# Patient Record
Sex: MALE | Race: WHITE | NOT HISPANIC OR LATINO | Employment: OTHER | URBAN - METROPOLITAN AREA
[De-identification: names, ages, dates, MRNs, and addresses within clinical notes are randomized per-mention and may not be internally consistent; named-entity substitution may affect disease eponyms.]

---

## 2024-07-21 ENCOUNTER — HOSPITAL ENCOUNTER (INPATIENT)
Facility: HOSPITAL | Age: 84
LOS: 4 days | Discharge: NURSING, REHAB OR LONG-TERM CARE | DRG: 280 | End: 2024-07-25
Attending: EMERGENCY MEDICINE | Admitting: INTERNAL MEDICINE
Payer: COMMERCIAL

## 2024-07-21 ENCOUNTER — APPOINTMENT (EMERGENCY)
Dept: RADIOLOGY | Facility: HOSPITAL | Age: 84
DRG: 280 | End: 2024-07-21
Payer: COMMERCIAL

## 2024-07-21 DIAGNOSIS — I10 PRIMARY HYPERTENSION: ICD-10-CM

## 2024-07-21 DIAGNOSIS — F03.911 DEMENTIA WITH AGITATION, UNSPECIFIED DEMENTIA SEVERITY, UNSPECIFIED DEMENTIA TYPE (HCC): ICD-10-CM

## 2024-07-21 DIAGNOSIS — I21.4 NSTEMI (NON-ST ELEVATED MYOCARDIAL INFARCTION) (HCC): ICD-10-CM

## 2024-07-21 DIAGNOSIS — R41.0 DELIRIUM: ICD-10-CM

## 2024-07-21 DIAGNOSIS — R07.9 CHEST PAIN: Primary | ICD-10-CM

## 2024-07-21 PROBLEM — R79.89 ELEVATED TROPONIN: Status: ACTIVE | Noted: 2024-07-21

## 2024-07-21 LAB
2HR DELTA HS TROPONIN: 286 NG/L
ALBUMIN SERPL BCG-MCNC: 3.9 G/DL (ref 3.5–5)
ALP SERPL-CCNC: 74 U/L (ref 34–104)
ALT SERPL W P-5'-P-CCNC: 9 U/L (ref 7–52)
ANION GAP SERPL CALCULATED.3IONS-SCNC: 15 MMOL/L (ref 4–13)
APTT PPP: 24 SECONDS (ref 23–37)
AST SERPL W P-5'-P-CCNC: 12 U/L (ref 13–39)
BASOPHILS # BLD AUTO: 0.09 THOUSANDS/ÂΜL (ref 0–0.1)
BASOPHILS NFR BLD AUTO: 1 % (ref 0–1)
BILIRUB SERPL-MCNC: 0.35 MG/DL (ref 0.2–1)
BUN SERPL-MCNC: 20 MG/DL (ref 5–25)
CALCIUM SERPL-MCNC: 9 MG/DL (ref 8.4–10.2)
CARDIAC TROPONIN I PNL SERPL HS: 117 NG/L
CARDIAC TROPONIN I PNL SERPL HS: 403 NG/L
CHLORIDE SERPL-SCNC: 101 MMOL/L (ref 96–108)
CO2 SERPL-SCNC: 19 MMOL/L (ref 21–32)
CREAT SERPL-MCNC: 0.81 MG/DL (ref 0.6–1.3)
D DIMER PPP FEU-MCNC: 0.69 UG/ML FEU
EOSINOPHIL # BLD AUTO: 0.18 THOUSAND/ÂΜL (ref 0–0.61)
EOSINOPHIL NFR BLD AUTO: 2 % (ref 0–6)
ERYTHROCYTE [DISTWIDTH] IN BLOOD BY AUTOMATED COUNT: 13.7 % (ref 11.6–15.1)
GFR SERPL CREATININE-BSD FRML MDRD: 80 ML/MIN/1.73SQ M
GLUCOSE SERPL-MCNC: 191 MG/DL (ref 65–140)
GLUCOSE SERPL-MCNC: 196 MG/DL (ref 65–140)
HCT VFR BLD AUTO: 37 % (ref 36.5–49.3)
HGB BLD-MCNC: 12 G/DL (ref 12–17)
IMM GRANULOCYTES # BLD AUTO: 0.05 THOUSAND/UL (ref 0–0.2)
IMM GRANULOCYTES NFR BLD AUTO: 1 % (ref 0–2)
INR PPP: 1.06 (ref 0.84–1.19)
LIPASE SERPL-CCNC: 17 U/L (ref 11–82)
LYMPHOCYTES # BLD AUTO: 1.42 THOUSANDS/ÂΜL (ref 0.6–4.47)
LYMPHOCYTES NFR BLD AUTO: 13 % (ref 14–44)
MAGNESIUM SERPL-MCNC: 1.6 MG/DL (ref 1.9–2.7)
MCH RBC QN AUTO: 30.5 PG (ref 26.8–34.3)
MCHC RBC AUTO-ENTMCNC: 32.4 G/DL (ref 31.4–37.4)
MCV RBC AUTO: 94 FL (ref 82–98)
MONOCYTES # BLD AUTO: 0.95 THOUSAND/ÂΜL (ref 0.17–1.22)
MONOCYTES NFR BLD AUTO: 9 % (ref 4–12)
NEUTROPHILS # BLD AUTO: 7.87 THOUSANDS/ÂΜL (ref 1.85–7.62)
NEUTS SEG NFR BLD AUTO: 74 % (ref 43–75)
NRBC BLD AUTO-RTO: 0 /100 WBCS
PLATELET # BLD AUTO: 349 THOUSANDS/UL (ref 149–390)
PMV BLD AUTO: 9.5 FL (ref 8.9–12.7)
POTASSIUM SERPL-SCNC: 3.8 MMOL/L (ref 3.5–5.3)
PROT SERPL-MCNC: 6.8 G/DL (ref 6.4–8.4)
PROTHROMBIN TIME: 14 SECONDS (ref 11.6–14.5)
RBC # BLD AUTO: 3.93 MILLION/UL (ref 3.88–5.62)
SODIUM SERPL-SCNC: 135 MMOL/L (ref 135–147)
WBC # BLD AUTO: 10.56 THOUSAND/UL (ref 4.31–10.16)

## 2024-07-21 PROCEDURE — 71045 X-RAY EXAM CHEST 1 VIEW: CPT

## 2024-07-21 PROCEDURE — 83880 ASSAY OF NATRIURETIC PEPTIDE: CPT | Performed by: NURSE PRACTITIONER

## 2024-07-21 PROCEDURE — 83735 ASSAY OF MAGNESIUM: CPT | Performed by: EMERGENCY MEDICINE

## 2024-07-21 PROCEDURE — 85730 THROMBOPLASTIN TIME PARTIAL: CPT | Performed by: EMERGENCY MEDICINE

## 2024-07-21 PROCEDURE — 99285 EMERGENCY DEPT VISIT HI MDM: CPT

## 2024-07-21 PROCEDURE — 36415 COLL VENOUS BLD VENIPUNCTURE: CPT | Performed by: EMERGENCY MEDICINE

## 2024-07-21 PROCEDURE — 99285 EMERGENCY DEPT VISIT HI MDM: CPT | Performed by: EMERGENCY MEDICINE

## 2024-07-21 PROCEDURE — 84145 PROCALCITONIN (PCT): CPT | Performed by: NURSE PRACTITIONER

## 2024-07-21 PROCEDURE — 85025 COMPLETE CBC W/AUTO DIFF WBC: CPT | Performed by: EMERGENCY MEDICINE

## 2024-07-21 PROCEDURE — 1124F ACP DISCUSS-NO DSCNMKR DOCD: CPT | Performed by: EMERGENCY MEDICINE

## 2024-07-21 PROCEDURE — 84484 ASSAY OF TROPONIN QUANT: CPT | Performed by: NURSE PRACTITIONER

## 2024-07-21 PROCEDURE — 80053 COMPREHEN METABOLIC PANEL: CPT | Performed by: EMERGENCY MEDICINE

## 2024-07-21 PROCEDURE — 99223 1ST HOSP IP/OBS HIGH 75: CPT | Performed by: NURSE PRACTITIONER

## 2024-07-21 PROCEDURE — 71275 CT ANGIOGRAPHY CHEST: CPT

## 2024-07-21 PROCEDURE — 85610 PROTHROMBIN TIME: CPT | Performed by: EMERGENCY MEDICINE

## 2024-07-21 PROCEDURE — 85379 FIBRIN DEGRADATION QUANT: CPT | Performed by: EMERGENCY MEDICINE

## 2024-07-21 PROCEDURE — 93005 ELECTROCARDIOGRAM TRACING: CPT

## 2024-07-21 PROCEDURE — 82948 REAGENT STRIP/BLOOD GLUCOSE: CPT

## 2024-07-21 PROCEDURE — 96375 TX/PRO/DX INJ NEW DRUG ADDON: CPT

## 2024-07-21 PROCEDURE — 96365 THER/PROPH/DIAG IV INF INIT: CPT

## 2024-07-21 PROCEDURE — 74174 CTA ABD&PLVS W/CONTRAST: CPT

## 2024-07-21 PROCEDURE — 83690 ASSAY OF LIPASE: CPT | Performed by: EMERGENCY MEDICINE

## 2024-07-21 PROCEDURE — 96361 HYDRATE IV INFUSION ADD-ON: CPT

## 2024-07-21 PROCEDURE — 0241U HB NFCT DS VIR RESP RNA 4 TRGT: CPT | Performed by: NURSE PRACTITIONER

## 2024-07-21 PROCEDURE — 84484 ASSAY OF TROPONIN QUANT: CPT | Performed by: EMERGENCY MEDICINE

## 2024-07-21 RX ORDER — FUROSEMIDE 10 MG/ML
40 INJECTION INTRAMUSCULAR; INTRAVENOUS ONCE
Status: DISCONTINUED | OUTPATIENT
Start: 2024-07-21 | End: 2024-07-21

## 2024-07-21 RX ORDER — SODIUM CHLORIDE 1 G/1
1 TABLET ORAL
Status: DISCONTINUED | OUTPATIENT
Start: 2024-07-22 | End: 2024-07-25 | Stop reason: HOSPADM

## 2024-07-21 RX ORDER — SODIUM CHLORIDE 1 G/1
1 TABLET ORAL
COMMUNITY

## 2024-07-21 RX ORDER — NITROGLYCERIN 0.4 MG/1
0.4 TABLET SUBLINGUAL ONCE
Status: DISCONTINUED | OUTPATIENT
Start: 2024-07-21 | End: 2024-07-21

## 2024-07-21 RX ORDER — AMLODIPINE BESYLATE 2.5 MG/1
2.5 TABLET ORAL DAILY
COMMUNITY
End: 2024-07-25

## 2024-07-21 RX ORDER — HEPARIN SODIUM 10000 [USP'U]/100ML
3-30 INJECTION, SOLUTION INTRAVENOUS
Status: DISPENSED | OUTPATIENT
Start: 2024-07-21 | End: 2024-07-23

## 2024-07-21 RX ORDER — GUAIFENESIN 600 MG/1
600 TABLET, EXTENDED RELEASE ORAL EVERY 12 HOURS SCHEDULED
Status: DISCONTINUED | OUTPATIENT
Start: 2024-07-21 | End: 2024-07-22

## 2024-07-21 RX ORDER — HEPARIN SODIUM 1000 [USP'U]/ML
5200 INJECTION, SOLUTION INTRAVENOUS; SUBCUTANEOUS EVERY 6 HOURS PRN
Status: DISCONTINUED | OUTPATIENT
Start: 2024-07-21 | End: 2024-07-24

## 2024-07-21 RX ORDER — NITROGLYCERIN 0.4 MG/1
TABLET SUBLINGUAL
Status: COMPLETED
Start: 2024-07-21 | End: 2024-07-21

## 2024-07-21 RX ORDER — CITALOPRAM 20 MG/1
20 TABLET ORAL DAILY
Status: DISCONTINUED | OUTPATIENT
Start: 2024-07-22 | End: 2024-07-24

## 2024-07-21 RX ORDER — ONDANSETRON 2 MG/ML
4 INJECTION INTRAMUSCULAR; INTRAVENOUS EVERY 6 HOURS PRN
Status: DISCONTINUED | OUTPATIENT
Start: 2024-07-21 | End: 2024-07-25 | Stop reason: HOSPADM

## 2024-07-21 RX ORDER — METOPROLOL TARTRATE 1 MG/ML
5 INJECTION, SOLUTION INTRAVENOUS ONCE
Status: COMPLETED | OUTPATIENT
Start: 2024-07-21 | End: 2024-07-21

## 2024-07-21 RX ORDER — CITALOPRAM 20 MG/1
20 TABLET ORAL DAILY
COMMUNITY
End: 2024-07-25

## 2024-07-21 RX ORDER — ATORVASTATIN CALCIUM 40 MG/1
40 TABLET, FILM COATED ORAL
Status: DISCONTINUED | OUTPATIENT
Start: 2024-07-21 | End: 2024-07-22

## 2024-07-21 RX ORDER — MEMANTINE HYDROCHLORIDE 21 MG/1
28 CAPSULE, EXTENDED RELEASE ORAL DAILY
COMMUNITY

## 2024-07-21 RX ORDER — ASPIRIN 81 MG/1
81 TABLET, CHEWABLE ORAL DAILY
Status: DISCONTINUED | OUTPATIENT
Start: 2024-07-22 | End: 2024-07-25 | Stop reason: HOSPADM

## 2024-07-21 RX ORDER — CEFTRIAXONE 1 G/50ML
1000 INJECTION, SOLUTION INTRAVENOUS EVERY 24 HOURS
Status: DISCONTINUED | OUTPATIENT
Start: 2024-07-21 | End: 2024-07-22

## 2024-07-21 RX ORDER — DIVALPROEX SODIUM 250 MG/1
250 TABLET, DELAYED RELEASE ORAL DAILY
COMMUNITY
End: 2024-07-25

## 2024-07-21 RX ORDER — AMLODIPINE BESYLATE 2.5 MG/1
2.5 TABLET ORAL DAILY
Status: DISCONTINUED | OUTPATIENT
Start: 2024-07-22 | End: 2024-07-21

## 2024-07-21 RX ORDER — INSULIN LISPRO 100 [IU]/ML
1-5 INJECTION, SOLUTION INTRAVENOUS; SUBCUTANEOUS
Status: DISCONTINUED | OUTPATIENT
Start: 2024-07-22 | End: 2024-07-25 | Stop reason: HOSPADM

## 2024-07-21 RX ORDER — DIVALPROEX SODIUM 250 MG/1
250 TABLET, DELAYED RELEASE ORAL DAILY
Status: DISCONTINUED | OUTPATIENT
Start: 2024-07-22 | End: 2024-07-24

## 2024-07-21 RX ORDER — HEPARIN SODIUM 1000 [USP'U]/ML
2600 INJECTION, SOLUTION INTRAVENOUS; SUBCUTANEOUS EVERY 6 HOURS PRN
Status: DISCONTINUED | OUTPATIENT
Start: 2024-07-21 | End: 2024-07-24

## 2024-07-21 RX ORDER — AMLODIPINE BESYLATE 2.5 MG/1
2.5 TABLET ORAL ONCE
Status: COMPLETED | OUTPATIENT
Start: 2024-07-21 | End: 2024-07-21

## 2024-07-21 RX ORDER — ACETAMINOPHEN 325 MG/1
650 TABLET ORAL EVERY 4 HOURS PRN
COMMUNITY

## 2024-07-21 RX ORDER — DAPAGLIFLOZIN 5 MG/1
5 TABLET, FILM COATED ORAL DAILY
COMMUNITY

## 2024-07-21 RX ORDER — MEMANTINE HYDROCHLORIDE 10 MG/1
10 TABLET ORAL 2 TIMES DAILY
Status: DISCONTINUED | OUTPATIENT
Start: 2024-07-22 | End: 2024-07-25 | Stop reason: HOSPADM

## 2024-07-21 RX ORDER — NITROGLYCERIN 0.4 MG/1
0.4 TABLET SUBLINGUAL ONCE
Status: COMPLETED | OUTPATIENT
Start: 2024-07-21 | End: 2024-07-21

## 2024-07-21 RX ORDER — MAGNESIUM SULFATE HEPTAHYDRATE 40 MG/ML
2 INJECTION, SOLUTION INTRAVENOUS ONCE
Status: COMPLETED | OUTPATIENT
Start: 2024-07-21 | End: 2024-07-21

## 2024-07-21 RX ORDER — INSULIN LISPRO 100 [IU]/ML
1-5 INJECTION, SOLUTION INTRAVENOUS; SUBCUTANEOUS
Status: DISCONTINUED | OUTPATIENT
Start: 2024-07-21 | End: 2024-07-25 | Stop reason: HOSPADM

## 2024-07-21 RX ORDER — AMLODIPINE BESYLATE 5 MG/1
5 TABLET ORAL DAILY
Status: DISCONTINUED | OUTPATIENT
Start: 2024-07-22 | End: 2024-07-22

## 2024-07-21 RX ORDER — VIT C/B6/B5/MAGNESIUM/HERB 173 50-5-6-5MG
500 CAPSULE ORAL
COMMUNITY

## 2024-07-21 RX ORDER — HEPARIN SODIUM 1000 [USP'U]/ML
5200 INJECTION, SOLUTION INTRAVENOUS; SUBCUTANEOUS ONCE
Status: COMPLETED | OUTPATIENT
Start: 2024-07-21 | End: 2024-07-21

## 2024-07-21 RX ORDER — FAMOTIDINE 20 MG/1
20 TABLET, FILM COATED ORAL
Status: DISCONTINUED | OUTPATIENT
Start: 2024-07-21 | End: 2024-07-25 | Stop reason: HOSPADM

## 2024-07-21 RX ORDER — FUROSEMIDE 10 MG/ML
40 INJECTION INTRAMUSCULAR; INTRAVENOUS ONCE
Status: COMPLETED | OUTPATIENT
Start: 2024-07-21 | End: 2024-07-21

## 2024-07-21 RX ORDER — ACETAMINOPHEN 325 MG/1
650 TABLET ORAL EVERY 6 HOURS PRN
Status: DISCONTINUED | OUTPATIENT
Start: 2024-07-21 | End: 2024-07-25 | Stop reason: HOSPADM

## 2024-07-21 RX ADMIN — AMLODIPINE BESYLATE 2.5 MG: 2.5 TABLET ORAL at 23:27

## 2024-07-21 RX ADMIN — Medication 12.5 MG: at 23:35

## 2024-07-21 RX ADMIN — FUROSEMIDE 40 MG: 10 INJECTION, SOLUTION INTRAMUSCULAR; INTRAVENOUS at 21:14

## 2024-07-21 RX ADMIN — HEPARIN SODIUM 5200 UNITS: 1000 INJECTION INTRAVENOUS; SUBCUTANEOUS at 21:49

## 2024-07-21 RX ADMIN — NITROGLYCERIN 0.1 MG: 0.4 TABLET SUBLINGUAL at 21:09

## 2024-07-21 RX ADMIN — CEFTRIAXONE 1000 MG: 1 INJECTION, SOLUTION INTRAVENOUS at 23:25

## 2024-07-21 RX ADMIN — SODIUM CHLORIDE 1000 ML: 0.9 INJECTION, SOLUTION INTRAVENOUS at 19:08

## 2024-07-21 RX ADMIN — GUAIFENESIN 600 MG: 600 TABLET, EXTENDED RELEASE ORAL at 23:28

## 2024-07-21 RX ADMIN — METOPROLOL TARTRATE 5 MG: 5 INJECTION INTRAVENOUS at 21:38

## 2024-07-21 RX ADMIN — HEPARIN SODIUM 18 UNITS/KG/HR: 10000 INJECTION, SOLUTION INTRAVENOUS at 21:49

## 2024-07-21 RX ADMIN — ATORVASTATIN CALCIUM 40 MG: 40 TABLET, FILM COATED ORAL at 23:27

## 2024-07-21 RX ADMIN — MAGNESIUM SULFATE HEPTAHYDRATE 2 G: 40 INJECTION, SOLUTION INTRAVENOUS at 20:07

## 2024-07-21 RX ADMIN — IOHEXOL 100 ML: 350 INJECTION, SOLUTION INTRAVENOUS at 20:53

## 2024-07-21 RX ADMIN — FAMOTIDINE 20 MG: 20 TABLET, FILM COATED ORAL at 23:27

## 2024-07-21 NOTE — ED PROVIDER NOTES
"History  Chief Complaint   Patient presents with    Chest Pain     Patient from [facility], hx severe dementia per squad. Patient started with 6/10 substernal CP radiating in between shoulder blades starting \"around dinner time\" per facility. Patient had some depressions on 12 lead per squad which apparently resolved, along with CP, after 1 dose of nitro and 324mg of aspirin. Patient denies CP at this time.      84-year-old male presents with substernal chest pain rating to the back according to the staff he is a very poor historian because of his dementia he denies any complaints at this time on the route here he got nitroglycerin and aspirin.  Denies any shortness of breath pleurisy vomiting nausea diaphoresis or any other symptoms history of hypertension and diabetes noted.      History provided by:  Patient   used: No        Prior to Admission Medications   Prescriptions Last Dose Informant Patient Reported? Taking?   Memantine HCl ER 21 MG CP24 7/21/2024  Yes Yes   Sig: Take 28 mg by mouth daily   Turmeric 500 MG CAPS 7/21/2024 Outside Facility (Specify) Yes Yes   Sig: Take 500 mg by mouth daily with dinner   acetaminophen (TYLENOL) 325 mg tablet Unknown Outside Facility (Specify) Yes No   Sig: Take 650 mg by mouth every 4 (four) hours as needed for mild pain or fever   amLODIPine (NORVASC) 2.5 mg tablet 7/21/2024  Yes Yes   Sig: Take 2.5 mg by mouth daily   citalopram (CeleXA) 20 mg tablet 7/21/2024  Yes Yes   Sig: Take 20 mg by mouth daily   dapagliflozin (Farxiga) 5 MG TABS 7/21/2024  Yes Yes   Sig: Take 5 mg by mouth daily   divalproex sodium (DEPAKOTE) 250 mg DR tablet 7/21/2024  Yes Yes   Sig: Take 250 mg by mouth daily   metFORMIN (GLUCOPHAGE) 500 mg tablet 7/21/2024  Yes Yes   Sig: Take 500 mg by mouth 2 (two) times a day with meals   sodium chloride 1 g tablet 7/21/2024  Yes Yes   Sig: Take 1 g by mouth daily with dinner      Facility-Administered Medications: None       Past " Medical History:   Diagnosis Date    Dementia (HCC)     DM (diabetes mellitus) (HCC)     HTN (hypertension)     Hyponatremia        History reviewed. No pertinent surgical history.    History reviewed. No pertinent family history.  I have reviewed and agree with the history as documented.    E-Cigarette/Vaping    E-Cigarette Use Unknown If Ever Used      E-Cigarette/Vaping Substances     Social History     Tobacco Use    Smoking status: Unknown   Vaping Use    Vaping status: Unknown   Substance Use Topics    Alcohol use: Never       Review of Systems   Constitutional: Negative.    HENT: Negative.     Eyes: Negative.    Respiratory: Negative.     Cardiovascular:  Positive for chest pain.   Gastrointestinal: Negative.    Endocrine: Negative.    Genitourinary: Negative.    Musculoskeletal: Negative.    Skin: Negative.    Allergic/Immunologic: Negative.    Neurological: Negative.    Hematological: Negative.    Psychiatric/Behavioral: Negative.     All other systems reviewed and are negative.      Physical Exam  Physical Exam  Vitals and nursing note reviewed.   Constitutional:       Appearance: Normal appearance.   HENT:      Head: Normocephalic and atraumatic.      Nose: Nose normal.      Mouth/Throat:      Mouth: Mucous membranes are moist.   Eyes:      Extraocular Movements: Extraocular movements intact.      Pupils: Pupils are equal, round, and reactive to light.   Cardiovascular:      Rate and Rhythm: Normal rate and regular rhythm.   Pulmonary:      Effort: Pulmonary effort is normal.      Breath sounds: Normal breath sounds.   Abdominal:      General: Abdomen is flat. Bowel sounds are normal.      Palpations: Abdomen is soft.   Musculoskeletal:         General: Normal range of motion.      Cervical back: Normal range of motion and neck supple.   Skin:     General: Skin is warm.      Capillary Refill: Capillary refill takes less than 2 seconds.   Neurological:      General: No focal deficit present.      Mental  Status: He is alert and oriented to person, place, and time. Mental status is at baseline.   Psychiatric:         Mood and Affect: Mood normal.         Thought Content: Thought content normal.         Vital Signs  ED Triage Vitals   Temperature Pulse Respirations Blood Pressure SpO2   07/21/24 2140 07/21/24 1900 07/21/24 1900 07/21/24 1900 07/21/24 1900   (!) 97.4 °F (36.3 °C) 91 18 166/78 96 %      Temp Source Heart Rate Source Patient Position - Orthostatic VS BP Location FiO2 (%)   07/21/24 2140 07/21/24 1900 07/21/24 1900 07/21/24 1900 --   Tympanic Monitor Lying Right arm       Pain Score       07/21/24 2242       No Pain           Vitals:    07/21/24 2200 07/21/24 2215 07/21/24 2242 07/22/24 0036   BP: 127/62 (!) 178/72 163/69 (!) 173/76   Pulse: 65 73 67 67   Patient Position - Orthostatic VS: Lying            Visual Acuity      ED Medications  Medications   heparin (porcine) 25,000 units in 0.45% NaCl 250 mL infusion (premix) (18 Units/kg/hr × 65 kg (Order-Specific) Intravenous New Bag 7/21/24 2149)   heparin (porcine) injection 5,200 Units (has no administration in time range)   heparin (porcine) injection 2,600 Units (has no administration in time range)   citalopram (CeleXA) tablet 20 mg (has no administration in time range)   divalproex sodium (DEPAKOTE) DR tablet 250 mg (has no administration in time range)   memantine (NAMENDA) tablet 10 mg (has no administration in time range)   sodium chloride tablet 1 g (has no administration in time range)   acetaminophen (TYLENOL) tablet 650 mg (has no administration in time range)   ondansetron (ZOFRAN) injection 4 mg (has no administration in time range)   atorvastatin (LIPITOR) tablet 40 mg (40 mg Oral Given 7/21/24 2327)   aspirin chewable tablet 81 mg (has no administration in time range)   metoprolol tartrate (LOPRESSOR) partial tablet 12.5 mg (12.5 mg Oral Given 7/21/24 2335)   insulin lispro (HumALOG/ADMELOG) 100 units/mL subcutaneous injection 1-5 Units  (has no administration in time range)   insulin lispro (HumALOG/ADMELOG) 100 units/mL subcutaneous injection 1-5 Units (1 Units Subcutaneous Given 7/22/24 0000)   cefTRIAXone (ROCEPHIN) IVPB (premix in dextrose) 1,000 mg 50 mL (1,000 mg Intravenous New Bag 7/21/24 2325)   famotidine (PEPCID) tablet 20 mg (20 mg Oral Given 7/21/24 2327)   nitroglycerin (NITROSTAT) SL tablet 0.4 mg (0.4 mg Sublingual Given 7/22/24 0032)   dextromethorphan-guaiFENesin (ROBITUSSIN DM) oral syrup 10 mL (has no administration in time range)   amLODIPine (NORVASC) tablet 5 mg (has no administration in time range)   sodium chloride 0.9 % bolus 1,000 mL (0 mL Intravenous Stopped 7/21/24 2008)   magnesium sulfate 2 g/50 mL IVPB (premix) 2 g (0 g Intravenous Stopped 7/21/24 2107)   iohexol (OMNIPAQUE) 350 MG/ML injection (MULTI-DOSE) 100 mL (100 mL Intravenous Given 7/21/24 2053)   nitroglycerin (NITROSTAT) SL tablet 0.4 mg (0.1 mg Sublingual Given 7/21/24 2109)   furosemide (LASIX) injection 40 mg (40 mg Intravenous Given 7/21/24 2114)   metoprolol (LOPRESSOR) injection 5 mg (5 mg Intravenous Given 7/21/24 2138)   heparin (porcine) injection 5,200 Units (5,200 Units Intravenous Given 7/21/24 2149)   amLODIPine (NORVASC) tablet 2.5 mg (2.5 mg Oral Given 7/21/24 2327)   QUEtiapine (SEROquel) tablet 12.5 mg (12.5 mg Oral Given 7/22/24 0037)   potassium chloride (Klor-Con M20) CR tablet 20 mEq (20 mEq Oral Given 7/22/24 0037)       Diagnostic Studies  Results Reviewed       Procedure Component Value Units Date/Time    B-Type Natriuretic Peptide(BNP) [[id number]]  (Abnormal) Collected: 07/21/24 1906    Lab Status: Final result Specimen: Blood from Arm, Right Updated: 07/22/24 0029      pg/mL     Procalcitonin [[id number]]  (Normal) Collected: 07/21/24 1906    Lab Status: Final result Specimen: Blood from Arm, Right Updated: 07/22/24 0022     Procalcitonin <0.05 ng/ml     HS Troponin I 4hr [[id number]]  (Abnormal) Collected: 07/21/24 3017     Lab Status: Final result Specimen: Blood from Hand, Right Updated: 07/22/24 0002     hs TnI 4hr 1,702 ng/L      Delta 4hr hsTnI 1,585 ng/L     Hemoglobin A1C [[id number]] Collected: 07/21/24 1906    Lab Status: In process Specimen: Blood from Arm, Right Updated: 07/21/24 2344    HS Troponin I 2hr [[id number]]  (Abnormal) Collected: 07/21/24 2109    Lab Status: Final result Specimen: Blood from Arm, Left Updated: 07/21/24 2147     hs TnI 2hr 403 ng/L      Delta 2hr hsTnI 286 ng/L     D-Dimer [[id number]]  (Abnormal) Collected: 07/21/24 1906    Lab Status: Final result Specimen: Blood from Arm, Right Updated: 07/21/24 1940     D-Dimer, Quant 0.69 ug/ml FEU     Narrative:      In the evaluation for possible pulmonary embolism, in the appropriate (Well's Score of 4 or less) patient, the age adjusted d-dimer cutoff for this patient can be calculated as:    Age x 0.01 (in ug/mL) for Age-adjusted D-dimer exclusion threshold for a patient over 50 years.    HS Troponin 0hr (reflex protocol) [[id number]]  (Abnormal) Collected: 07/21/24 1906    Lab Status: Final result Specimen: Blood from Arm, Right Updated: 07/21/24 1935     hs TnI 0hr 117 ng/L     Comprehensive metabolic panel [[id number]]  (Abnormal) Collected: 07/21/24 1906    Lab Status: Final result Specimen: Blood from Arm, Right Updated: 07/21/24 1929     Sodium 135 mmol/L      Potassium 3.8 mmol/L      Chloride 101 mmol/L      CO2 19 mmol/L      ANION GAP 15 mmol/L      BUN 20 mg/dL      Creatinine 0.81 mg/dL      Glucose 196 mg/dL      Calcium 9.0 mg/dL      AST 12 U/L      ALT 9 U/L      Alkaline Phosphatase 74 U/L      Total Protein 6.8 g/dL      Albumin 3.9 g/dL      Total Bilirubin 0.35 mg/dL      eGFR 80 ml/min/1.73sq m     Narrative:      National Kidney Disease Foundation guidelines for Chronic Kidney Disease (CKD):     Stage 1 with normal or high GFR (GFR > 90 mL/min/1.73 square meters)    Stage 2 Mild CKD (GFR = 60-89 mL/min/1.73 square meters)    Stage 3A  Moderate CKD (GFR = 45-59 mL/min/1.73 square meters)    Stage 3B Moderate CKD (GFR = 30-44 mL/min/1.73 square meters)    Stage 4 Severe CKD (GFR = 15-29 mL/min/1.73 square meters)    Stage 5 End Stage CKD (GFR <15 mL/min/1.73 square meters)  Note: GFR calculation is accurate only with a steady state creatinine    Magnesium [[id number]]  (Abnormal) Collected: 07/21/24 1906    Lab Status: Final result Specimen: Blood from Arm, Right Updated: 07/21/24 1929     Magnesium 1.6 mg/dL     Lipase [[id number]]  (Normal) Collected: 07/21/24 1906    Lab Status: Final result Specimen: Blood from Arm, Right Updated: 07/21/24 1929     Lipase 17 u/L     Protime-INR [[id number]]  (Normal) Collected: 07/21/24 1906    Lab Status: Final result Specimen: Blood from Arm, Right Updated: 07/21/24 1927     Protime 14.0 seconds      INR 1.06    APTT [[id number]]  (Normal) Collected: 07/21/24 1906    Lab Status: Final result Specimen: Blood from Arm, Right Updated: 07/21/24 1927     PTT 24 seconds     CBC and differential [[id number]]  (Abnormal) Collected: 07/21/24 1906    Lab Status: Final result Specimen: Blood from Arm, Right Updated: 07/21/24 1911     WBC 10.56 Thousand/uL      RBC 3.93 Million/uL      Hemoglobin 12.0 g/dL      Hematocrit 37.0 %      MCV 94 fL      MCH 30.5 pg      MCHC 32.4 g/dL      RDW 13.7 %      MPV 9.5 fL      Platelets 349 Thousands/uL      nRBC 0 /100 WBCs      Segmented % 74 %      Immature Grans % 1 %      Lymphocytes % 13 %      Monocytes % 9 %      Eosinophils Relative 2 %      Basophils Relative 1 %      Absolute Neutrophils 7.87 Thousands/µL      Absolute Immature Grans 0.05 Thousand/uL      Absolute Lymphocytes 1.42 Thousands/µL      Absolute Monocytes 0.95 Thousand/µL      Eosinophils Absolute 0.18 Thousand/µL      Basophils Absolute 0.09 Thousands/µL                    CTA dissection protocol chest/abdomen/pelvis   Final Result by Beth Wallace MD (07/21 2129)      No evidence of aortic dissection.       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         Workstation performed: [id number]         XR chest 1 view portable    (Results Pending)              Procedures  ECG 12 Lead Documentation Only    Date/Time: 7/21/2024 7:06 PM    Performed by: Rosalind Gusman DO  Authorized by: Rosalind Gusman DO    ECG reviewed by me, the ED Provider: yes    Patient location:  ED  Previous ECG:     Previous ECG:  Unavailable    Comparison to cardiac monitor: Yes    Interpretation:     Interpretation: non-specific    Rate:     ECG rate assessment: normal    Rhythm:     Rhythm: sinus rhythm    Ectopy:     Ectopy: PVCs    QRS:     QRS axis:  Normal  Conduction:     Conduction: normal    ST segments:     ST segments:  Abnormal    Depression:  V1, V2, V3, V6 and V5  T waves:     T waves: non-specific             ED Course                                               Medical Decision Making  Patient evaluated in the ED with EKG labs and imaging.  I spoke to the cardiologist on-call reviewed EKGs with him Dr. Turner troponin elevated for calling this non-ST elevation MI.  Started on heparin drip initially given nitro and Lasix as he got short of breath possibly secondary to flash pulmonary edema patient much improved remained stable ICU evaluated at bedside patient stable to go to stepdown to admitted to the hospitalist for further evaluation and management    Problems Addressed:  Chest pain: acute illness or injury  NSTEMI (non-ST elevated myocardial infarction) (HCC): acute illness or injury    Amount and/or Complexity of Data Reviewed  External Data Reviewed: notes.  Labs: ordered. Decision-making details documented in ED Course.  Radiology: ordered. Decision-making details documented in ED Course.  ECG/medicine tests: ordered and independent interpretation  performed. Decision-making details documented in ED Course.    Risk  Prescription drug management.  Decision regarding hospitalization.                 Disposition  Final diagnoses:   Chest pain   NSTEMI (non-ST elevated myocardial infarction) ([facility])     Time reflects when diagnosis was documented in both MDM as applicable and the Disposition within this note       Time User Action Codes Description Comment    7/21/2024 10:05 PM Rosalind Gusman [R07.9] Chest pain     7/21/2024 10:05 PM Rosalind Gusman [I21.4] NSTEMI (non-ST elevated myocardial infarction) ([facility])           ED Disposition       ED Disposition   Admit    Condition   Stable    Date/Time   Sun Jul 21, 2024 10:05 PM    Comment                  Follow-up Information    None         Current Discharge Medication List        CONTINUE these medications which have NOT CHANGED    Details   amLODIPine (NORVASC) 2.5 mg tablet Take 2.5 mg by mouth daily      citalopram (CeleXA) 20 mg tablet Take 20 mg by mouth daily      dapagliflozin (Farxiga) 5 MG TABS Take 5 mg by mouth daily      divalproex sodium (DEPAKOTE) 250 mg DR tablet Take 250 mg by mouth daily      Memantine HCl ER 21 MG CP24 Take 28 mg by mouth daily      metFORMIN (GLUCOPHAGE) 500 mg tablet Take 500 mg by mouth 2 (two) times a day with meals      sodium chloride 1 g tablet Take 1 g by mouth daily with dinner      Turmeric 500 MG CAPS Take 500 mg by mouth daily with dinner      acetaminophen (TYLENOL) 325 mg tablet Take 650 mg by mouth every 4 (four) hours as needed for mild pain or fever             No discharge procedures on file.    PDMP Review       None            ED Provider  Electronically Signed by             Rosalind Gusman DO  07/22/24 0103

## 2024-07-22 ENCOUNTER — APPOINTMENT (INPATIENT)
Dept: NON INVASIVE DIAGNOSTICS | Facility: HOSPITAL | Age: 84
DRG: 280 | End: 2024-07-22
Payer: COMMERCIAL

## 2024-07-22 ENCOUNTER — APPOINTMENT (INPATIENT)
Dept: RADIOLOGY | Facility: HOSPITAL | Age: 84
DRG: 280 | End: 2024-07-22
Payer: COMMERCIAL

## 2024-07-22 PROBLEM — I21.4 NSTEMI (NON-ST ELEVATED MYOCARDIAL INFARCTION) (HCC): Status: ACTIVE | Noted: 2024-07-21

## 2024-07-22 PROBLEM — R93.89 ABNORMAL CT SCAN: Status: ACTIVE | Noted: 2024-07-22

## 2024-07-22 PROBLEM — I73.9 PAD (PERIPHERAL ARTERY DISEASE) (HCC): Status: ACTIVE | Noted: 2024-07-22

## 2024-07-22 PROBLEM — R05.9 COUGH: Status: ACTIVE | Noted: 2024-07-22

## 2024-07-22 PROBLEM — J96.01 ACUTE HYPOXEMIC RESPIRATORY FAILURE (HCC): Status: ACTIVE | Noted: 2024-07-22

## 2024-07-22 LAB
4HR DELTA HS TROPONIN: 1585 NG/L
ANION GAP SERPL CALCULATED.3IONS-SCNC: 9 MMOL/L (ref 4–13)
AORTIC ROOT: 3.7 CM
APICAL FOUR CHAMBER EJECTION FRACTION: 47 %
APTT PPP: 105 SECONDS (ref 23–37)
APTT PPP: 174 SECONDS (ref 23–37)
APTT PPP: 69 SECONDS (ref 23–37)
ATRIAL RATE: 47 BPM
ATRIAL RATE: 65 BPM
ATRIAL RATE: 86 BPM
ATRIAL RATE: 91 BPM
BASOPHILS # BLD AUTO: 0.07 THOUSANDS/ÂΜL (ref 0–0.1)
BASOPHILS NFR BLD AUTO: 1 % (ref 0–1)
BNP SERPL-MCNC: 176 PG/ML (ref 0–100)
BSA FOR ECHO PROCEDURE: 1.75 M2
BUN SERPL-MCNC: 19 MG/DL (ref 5–25)
CALCIUM SERPL-MCNC: 8.5 MG/DL (ref 8.4–10.2)
CARDIAC TROPONIN I PNL SERPL HS: 1702 NG/L
CARDIAC TROPONIN I PNL SERPL HS: 4485 NG/L (ref 8–18)
CARDIAC TROPONIN I PNL SERPL HS: 4857 NG/L (ref 8–18)
CHLORIDE SERPL-SCNC: 101 MMOL/L (ref 96–108)
CHOLEST SERPL-MCNC: 297 MG/DL
CO2 SERPL-SCNC: 24 MMOL/L (ref 21–32)
CREAT SERPL-MCNC: 0.76 MG/DL (ref 0.6–1.3)
E WAVE DECELERATION TIME: 246 MS
E/A RATIO: 0.62
EOSINOPHIL # BLD AUTO: 0.1 THOUSAND/ÂΜL (ref 0–0.61)
EOSINOPHIL NFR BLD AUTO: 1 % (ref 0–6)
ERYTHROCYTE [DISTWIDTH] IN BLOOD BY AUTOMATED COUNT: 13.5 % (ref 11.6–15.1)
EST. AVERAGE GLUCOSE BLD GHB EST-MCNC: 154 MG/DL
FLUAV RNA RESP QL NAA+PROBE: NEGATIVE
FLUBV RNA RESP QL NAA+PROBE: NEGATIVE
FRACTIONAL SHORTENING: 11 (ref 28–44)
GFR SERPL CREATININE-BSD FRML MDRD: 82 ML/MIN/1.73SQ M
GLUCOSE SERPL-MCNC: 133 MG/DL (ref 65–140)
GLUCOSE SERPL-MCNC: 143 MG/DL (ref 65–140)
GLUCOSE SERPL-MCNC: 149 MG/DL (ref 65–140)
GLUCOSE SERPL-MCNC: 161 MG/DL (ref 65–140)
GLUCOSE SERPL-MCNC: 178 MG/DL (ref 65–140)
HBA1C MFR BLD: 7 %
HCT VFR BLD AUTO: 36.2 % (ref 36.5–49.3)
HDLC SERPL-MCNC: 39 MG/DL
HGB BLD-MCNC: 12.1 G/DL (ref 12–17)
IMM GRANULOCYTES # BLD AUTO: 0.05 THOUSAND/UL (ref 0–0.2)
IMM GRANULOCYTES NFR BLD AUTO: 1 % (ref 0–2)
INTERVENTRICULAR SEPTUM IN DIASTOLE (PARASTERNAL SHORT AXIS VIEW): 1.2 CM
INTERVENTRICULAR SEPTUM: 1.2 CM (ref 0.6–1.1)
LAAS-AP2: 22 CM2
LAAS-AP4: 16.4 CM2
LDLC SERPL CALC-MCNC: 240 MG/DL (ref 0–100)
LEFT ATRIUM SIZE: 3.3 CM
LEFT ATRIUM VOLUME (MOD BIPLANE): 59 ML
LEFT ATRIUM VOLUME INDEX (MOD BIPLANE): 36 ML/M2
LEFT INTERNAL DIMENSION IN SYSTOLE: 3.4 CM (ref 2.1–4)
LEFT VENTRICLE DIASTOLIC VOLUME (MOD BIPLANE): 119 ML
LEFT VENTRICLE DIASTOLIC VOLUME INDEX (MOD BIPLANE): 68 ML/M2
LEFT VENTRICLE SYSTOLIC VOLUME (MOD BIPLANE): 58 ML
LEFT VENTRICLE SYSTOLIC VOLUME INDEX (MOD BIPLANE): 33.1 ML/M2
LEFT VENTRICULAR INTERNAL DIMENSION IN DIASTOLE: 3.8 CM (ref 3.5–6)
LEFT VENTRICULAR POSTERIOR WALL IN END DIASTOLE: 1.2 CM
LEFT VENTRICULAR STROKE VOLUME: 14 ML
LV EF: 51 %
LVSV (TEICH): 14 ML
LYMPHOCYTES # BLD AUTO: 1.96 THOUSANDS/ÂΜL (ref 0.6–4.47)
LYMPHOCYTES NFR BLD AUTO: 18 % (ref 14–44)
MAGNESIUM SERPL-MCNC: 2.2 MG/DL (ref 1.9–2.7)
MCH RBC QN AUTO: 31 PG (ref 26.8–34.3)
MCHC RBC AUTO-ENTMCNC: 33.4 G/DL (ref 31.4–37.4)
MCV RBC AUTO: 93 FL (ref 82–98)
MONOCYTES # BLD AUTO: 1.03 THOUSAND/ÂΜL (ref 0.17–1.22)
MONOCYTES NFR BLD AUTO: 9 % (ref 4–12)
MV E'TISSUE VEL-LAT: 4 CM/S
MV E'TISSUE VEL-SEP: 4 CM/S
MV PEAK A VEL: 1.08 M/S
MV PEAK E VEL: 67 CM/S
MV STENOSIS PRESSURE HALF TIME: 71 MS
MV VALVE AREA P 1/2 METHOD: 3.1
NEUTROPHILS # BLD AUTO: 7.73 THOUSANDS/ÂΜL (ref 1.85–7.62)
NEUTS SEG NFR BLD AUTO: 70 % (ref 43–75)
NRBC BLD AUTO-RTO: 0 /100 WBCS
P AXIS: 29 DEGREES
P AXIS: 50 DEGREES
P AXIS: 57 DEGREES
P AXIS: 66 DEGREES
PLATELET # BLD AUTO: 337 THOUSANDS/UL (ref 149–390)
PMV BLD AUTO: 9.7 FL (ref 8.9–12.7)
POTASSIUM SERPL-SCNC: 4.3 MMOL/L (ref 3.5–5.3)
PR INTERVAL: 168 MS
PR INTERVAL: 174 MS
PR INTERVAL: 198 MS
PR INTERVAL: 212 MS
PROCALCITONIN SERPL-MCNC: <0.05 NG/ML
QRS AXIS: 59 DEGREES
QRS AXIS: 60 DEGREES
QRS AXIS: 70 DEGREES
QRS AXIS: 75 DEGREES
QRSD INTERVAL: 84 MS
QRSD INTERVAL: 84 MS
QRSD INTERVAL: 92 MS
QRSD INTERVAL: 92 MS
QT INTERVAL: 340 MS
QT INTERVAL: 356 MS
QT INTERVAL: 388 MS
QT INTERVAL: 450 MS
QTC INTERVAL: 406 MS
QTC INTERVAL: 437 MS
QTC INTERVAL: 468 MS
QTC INTERVAL: 477 MS
RBC # BLD AUTO: 3.9 MILLION/UL (ref 3.88–5.62)
RIGHT ATRIUM AREA SYSTOLE A4C: 11.1 CM2
RIGHT VENTRICLE ID DIMENSION: 2.7 CM
RSV RNA RESP QL NAA+PROBE: NEGATIVE
SARS-COV-2 RNA RESP QL NAA+PROBE: NEGATIVE
SL CV LEFT ATRIUM LENGTH A2C: 5.2 CM
SL CV LV EF: 45
SL CV PED ECHO LEFT VENTRICLE DIASTOLIC VOLUME (MOD BIPLANE) 2D: 61 ML
SL CV PED ECHO LEFT VENTRICLE SYSTOLIC VOLUME (MOD BIPLANE) 2D: 47 ML
SODIUM SERPL-SCNC: 134 MMOL/L (ref 135–147)
T WAVE AXIS: -74 DEGREES
T WAVE AXIS: 117 DEGREES
T WAVE AXIS: 261 DEGREES
T WAVE AXIS: 71 DEGREES
TR MAX PG: 25 MMHG
TR PEAK VELOCITY: 2.5 M/S
TRICUSPID ANNULAR PLANE SYSTOLIC EXCURSION: 1.8 CM
TRICUSPID VALVE PEAK REGURGITATION VELOCITY: 2.52 M/S
TRIGL SERPL-MCNC: 90 MG/DL
TSH SERPL DL<=0.05 MIU/L-ACNC: 1.89 UIU/ML (ref 0.45–4.5)
VENTRICULAR RATE: 65 BPM
VENTRICULAR RATE: 86 BPM
VENTRICULAR RATE: 91 BPM
VENTRICULAR RATE: 91 BPM
WBC # BLD AUTO: 10.94 THOUSAND/UL (ref 4.31–10.16)

## 2024-07-22 PROCEDURE — 97163 PT EVAL HIGH COMPLEX 45 MIN: CPT

## 2024-07-22 PROCEDURE — 80048 BASIC METABOLIC PNL TOTAL CA: CPT | Performed by: NURSE PRACTITIONER

## 2024-07-22 PROCEDURE — 84484 ASSAY OF TROPONIN QUANT: CPT | Performed by: NURSE PRACTITIONER

## 2024-07-22 PROCEDURE — 87081 CULTURE SCREEN ONLY: CPT | Performed by: NURSE PRACTITIONER

## 2024-07-22 PROCEDURE — 71045 X-RAY EXAM CHEST 1 VIEW: CPT

## 2024-07-22 PROCEDURE — 97110 THERAPEUTIC EXERCISES: CPT

## 2024-07-22 PROCEDURE — 93306 TTE W/DOPPLER COMPLETE: CPT | Performed by: INTERNAL MEDICINE

## 2024-07-22 PROCEDURE — 93306 TTE W/DOPPLER COMPLETE: CPT

## 2024-07-22 PROCEDURE — 82948 REAGENT STRIP/BLOOD GLUCOSE: CPT

## 2024-07-22 PROCEDURE — 99232 SBSQ HOSP IP/OBS MODERATE 35: CPT | Performed by: STUDENT IN AN ORGANIZED HEALTH CARE EDUCATION/TRAINING PROGRAM

## 2024-07-22 PROCEDURE — 85025 COMPLETE CBC W/AUTO DIFF WBC: CPT | Performed by: NURSE PRACTITIONER

## 2024-07-22 PROCEDURE — 83036 HEMOGLOBIN GLYCOSYLATED A1C: CPT | Performed by: NURSE PRACTITIONER

## 2024-07-22 PROCEDURE — 97167 OT EVAL HIGH COMPLEX 60 MIN: CPT

## 2024-07-22 PROCEDURE — 84443 ASSAY THYROID STIM HORMONE: CPT | Performed by: NURSE PRACTITIONER

## 2024-07-22 PROCEDURE — 99223 1ST HOSP IP/OBS HIGH 75: CPT | Performed by: INTERNAL MEDICINE

## 2024-07-22 PROCEDURE — 83735 ASSAY OF MAGNESIUM: CPT | Performed by: NURSE PRACTITIONER

## 2024-07-22 PROCEDURE — 85730 THROMBOPLASTIN TIME PARTIAL: CPT | Performed by: STUDENT IN AN ORGANIZED HEALTH CARE EDUCATION/TRAINING PROGRAM

## 2024-07-22 PROCEDURE — 93010 ELECTROCARDIOGRAM REPORT: CPT | Performed by: INTERNAL MEDICINE

## 2024-07-22 PROCEDURE — 85730 THROMBOPLASTIN TIME PARTIAL: CPT | Performed by: NURSE PRACTITIONER

## 2024-07-22 PROCEDURE — 80061 LIPID PANEL: CPT | Performed by: NURSE PRACTITIONER

## 2024-07-22 RX ORDER — GUAIFENESIN/DEXTROMETHORPHAN 100-10MG/5
10 SYRUP ORAL EVERY 4 HOURS PRN
Status: DISCONTINUED | OUTPATIENT
Start: 2024-07-22 | End: 2024-07-25 | Stop reason: HOSPADM

## 2024-07-22 RX ORDER — QUETIAPINE FUMARATE 25 MG/1
12.5 TABLET, FILM COATED ORAL ONCE
Status: COMPLETED | OUTPATIENT
Start: 2024-07-22 | End: 2024-07-22

## 2024-07-22 RX ORDER — POTASSIUM CHLORIDE 20 MEQ/1
20 TABLET, EXTENDED RELEASE ORAL ONCE
Status: COMPLETED | OUTPATIENT
Start: 2024-07-22 | End: 2024-07-22

## 2024-07-22 RX ORDER — NITROGLYCERIN 0.4 MG/1
0.4 TABLET SUBLINGUAL
Status: DISCONTINUED | OUTPATIENT
Start: 2024-07-22 | End: 2024-07-25 | Stop reason: HOSPADM

## 2024-07-22 RX ORDER — ATORVASTATIN CALCIUM 80 MG/1
80 TABLET, FILM COATED ORAL
Status: DISCONTINUED | OUTPATIENT
Start: 2024-07-22 | End: 2024-07-25 | Stop reason: HOSPADM

## 2024-07-22 RX ORDER — AMLODIPINE BESYLATE 5 MG/1
5 TABLET ORAL DAILY
Status: DISCONTINUED | OUTPATIENT
Start: 2024-07-22 | End: 2024-07-25 | Stop reason: HOSPADM

## 2024-07-22 RX ADMIN — NITROGLYCERIN 0.4 MG: 0.4 TABLET SUBLINGUAL at 00:32

## 2024-07-22 RX ADMIN — CITALOPRAM HYDROBROMIDE 20 MG: 20 TABLET ORAL at 08:34

## 2024-07-22 RX ADMIN — Medication 12.5 MG: at 21:35

## 2024-07-22 RX ADMIN — SODIUM CHLORIDE 1 G: 1 TABLET ORAL at 17:34

## 2024-07-22 RX ADMIN — MEMANTINE 10 MG: 10 TABLET ORAL at 17:34

## 2024-07-22 RX ADMIN — INSULIN LISPRO 1 UNITS: 100 INJECTION, SOLUTION INTRAVENOUS; SUBCUTANEOUS at 00:00

## 2024-07-22 RX ADMIN — Medication 12.5 MG: at 08:34

## 2024-07-22 RX ADMIN — ATORVASTATIN CALCIUM 80 MG: 80 TABLET, FILM COATED ORAL at 17:34

## 2024-07-22 RX ADMIN — FAMOTIDINE 20 MG: 20 TABLET, FILM COATED ORAL at 21:35

## 2024-07-22 RX ADMIN — AMLODIPINE BESYLATE 5 MG: 5 TABLET ORAL at 08:34

## 2024-07-22 RX ADMIN — ASPIRIN 81 MG CHEWABLE TABLET 81 MG: 81 TABLET CHEWABLE at 08:34

## 2024-07-22 RX ADMIN — QUETIAPINE FUMARATE 12.5 MG: 25 TABLET ORAL at 00:37

## 2024-07-22 RX ADMIN — POTASSIUM CHLORIDE 20 MEQ: 1500 TABLET, EXTENDED RELEASE ORAL at 00:37

## 2024-07-22 RX ADMIN — MEMANTINE 10 MG: 10 TABLET ORAL at 08:34

## 2024-07-22 RX ADMIN — HEPARIN SODIUM 13 UNITS/KG/HR: 10000 INJECTION, SOLUTION INTRAVENOUS at 21:37

## 2024-07-22 RX ADMIN — INSULIN LISPRO 1 UNITS: 100 INJECTION, SOLUTION INTRAVENOUS; SUBCUTANEOUS at 17:32

## 2024-07-22 RX ADMIN — DIVALPROEX SODIUM 250 MG: 250 TABLET, DELAYED RELEASE ORAL at 08:34

## 2024-07-22 NOTE — ASSESSMENT & PLAN NOTE
On Norvasc 2.5 mg p.o. daily outpatient.  BP labile in ED.  Will order additional Norvasc 2.5 mg p.o. now and increase Norvasc to 5 mg p.o. daily starting tomorrow  Started on low-dose metoprolol as above  Goal SBP < 150 per cardiology recommendation.  Monitor

## 2024-07-22 NOTE — UTILIZATION REVIEW
NOTIFICATION OF INPATIENT ADMISSION   AUTHORIZATION REQUEST   SERVICING FACILITY:   [place] [address]  Tax ID: [id number]  NPI: [id number] ATTENDING PROVIDER:  Attending Name and NPI#: Enio Carlson Md [[id number]]  Address: [address] [place] [address]  Phone: [phone]   ADMISSION INFORMATION:  Place of Service: Inpatient Acute [facility]  Place of Service Code: 21  Inpatient Admission Date/Time: 7/21/24 10:04 PM  Discharge Date/Time: No discharge date for patient encounter.  Admitting Diagnosis Code/Description:  Chest pain [R07.9]  NSTEMI (non-ST elevated myocardial infarction) (HCC) [I21.4]     UTILIZATION REVIEW CONTACT:  Poornima Jensen Utilization   Network Utilization Review Department  Phone: [phone]  Fax [phone]  Email: [email or web address]  Contact for approvals/pending authorizations, clinical reviews, and discharge.     PHYSICIAN ADVISORY SERVICES:  Medical Necessity Denial & Rwfg-eu-Ut[employer] Review  Phone: [phone]  Fax: [phone]  Email: [email or web address]     DISCHARGE SUPPORT TEAM:  For Patients Discharge Needs & Updates  Phone: [phone] opt. 2 Fax: [phone]  Email: [email or web address]

## 2024-07-22 NOTE — ASSESSMENT & PLAN NOTE
Patient presents with chest pain.  Per staff at [facility], patient was seen shaking in the dining room after dinner, so staff attended to him and he reported he had chest pain with radiation to back.  911 was called.  EKG showed V3 V4 ST depression, patient was given 4 baby aspirin by EMS.  Per ED note, chest pain resolved after 1 dose of nitro and 324mg of aspirin.   Initial EKG showed sinus rhythm with occasional PVC, ST depression V2 through V6  Initial troponin 117.  Repeat troponin [id number]  Second EKG showed sinus rhythm with first-degree AV block, ST depression V1 through V6  Third EKG showed NSR, subtle ST depression V3 to V6, flat T wave in inferior leads, QTc 468.  CTA dissection protocol no evidence of aortic dissection  Currently on IV Heparin drip  Resume Asprin, Statin and Metoprolol  TTE pending  Resume telemetry monitoring  Cardiology consulted

## 2024-07-22 NOTE — PROGRESS NOTES
Patient keeps on getting up every 5 minutes, removing Masimo sensor, telemetry leads, pulling IV's, even pushing the IV poll, very forgetful. Hospitalist Atitla Garrison made aware with new order to give Seroquel 12.5 mg po x 1 dose.     Patient was also observed holding his chest and  moaning. On asking he verbalized he's having chest pain but unable to rate severity.  EKG obtained showed NSR with ST and T wave abnormality. Hospitalist made aware with new order to give PRN Nitroglycerin  0.4 mg.  Given at 0032 with relief after 5 minutes.    Telemonitoring in progress. Continue plan of care.

## 2024-07-22 NOTE — ASSESSMENT & PLAN NOTE
"No results found for: \"HGBA1C\"    Recent Labs     07/21/24  2340 07/22/24  0720   POCGLU 191* 143*         Blood Sugar Average: Last 72 hrs:  (P) 167    Resume SSI regimen   "

## 2024-07-22 NOTE — ASSESSMENT & PLAN NOTE
Continue Namenda, Celexa and Depakote  Was agitated overnight and was given Seroquel along with virtual observation.  Can consider Psych evaluation if persistent

## 2024-07-22 NOTE — ASSESSMENT & PLAN NOTE
CTA showed - Severe vessel disease or occlusion at the V2 segment of the proximal left vertebral artery.Severe stenosis with poststenotic dilatation at the celiac axis. 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  Refer to vascular on discharge  Patient started on aspirin Lipitor as above  Lipid panel in a.m.

## 2024-07-22 NOTE — ASSESSMENT & PLAN NOTE
Pt was started on Ceftriaxone empirically during admission. CT reveals no evidence of pneumonia.  Procal level is WNL.  Pt has no leukocytosis and is afebrile.  Will discontinue Ceftriaxone and follow clinically

## 2024-07-22 NOTE — PLAN OF CARE
Problem: PAIN - ADULT  Goal: Verbalizes/displays adequate comfort level or baseline comfort level  Description: Interventions:  - Encourage patient to monitor pain and request assistance  - Assess pain using appropriate pain scale  - Administer analgesics based on type and severity of pain and evaluate response  - Implement non-pharmacological measures as appropriate and evaluate response  - Consider cultural and social influences on pain and pain management  - Notify physician/advanced practitioner if interventions unsuccessful or patient reports new pain  Outcome: Progressing     Problem: INFECTION - ADULT  Goal: Absence or prevention of progression during hospitalization  Description: INTERVENTIONS:  - Assess and monitor for signs and symptoms of infection  - Monitor lab/diagnostic results  - Monitor all insertion sites, i.e. indwelling lines, tubes, and drains  - Monitor endotracheal if appropriate and nasal secretions for changes in amount and color  - [place] appropriate cooling/warming therapies per order  - Administer medications as ordered  - Instruct and encourage patient and family to use good hand hygiene technique  - Identify and instruct in appropriate isolation precautions for identified infection/condition  Outcome: Progressing     Problem: SAFETY ADULT  Goal: Patient will remain free of falls  Description: INTERVENTIONS:  - Educate patient/family on patient safety including physical limitations  - Instruct patient to call for assistance with activity   - Consult OT/PT to assist with strengthening/mobility   - Keep Call bell within reach  - Keep bed low and locked with side rails adjusted as appropriate  - Keep care items and personal belongings within reach  - Initiate and maintain comfort rounds  - Make Fall Risk Sign visible to staff  - Offer Toileting every 2 Hours, in advance of need  - Initiate/Maintain bed alarm  - Obtain necessary fall risk management equipment:   - Apply yellow socks and  bracelet for high fall risk patients  - Consider moving patient to room near nurses station  Outcome: Progressing  Goal: Maintain or return to baseline ADL function  Description: INTERVENTIONS:  -  Assess patient's ability to carry out ADLs; assess patient's baseline for ADL function and identify physical deficits which impact ability to perform ADLs (bathing, care of mouth/teeth, toileting, grooming, dressing, etc.)  - Assess/evaluate cause of self-care deficits   - Assess range of motion  - Assess patient's mobility; develop plan if impaired  - Assess patient's need for assistive devices and provide as appropriate  - Encourage maximum independence but intervene and supervise when necessary  - Involve family in performance of ADLs  - Assess for home care needs following discharge   - Consider OT consult to assist with ADL evaluation and planning for discharge  - Provide patient education as appropriate  Outcome: Progressing  Goal: Maintains/Returns to pre admission functional level  Description: INTERVENTIONS:  - Perform AM-PAC 6 Click Basic Mobility/ Daily Activity assessment daily.  - Set and communicate daily mobility goal to care team and patient/family/caregiver.   - Collaborate with rehabilitation services on mobility goals if consulted  - Perform Range of Motion 2 times a day.  - Reposition patient every 2 hours.  - Dangle patient 3 times a day  - Stand patient 3 times a day  - Ambulate patient 3 times a day  - Out of bed to chair 3 times a day   - Out of bed for meals 3 times a day  - Out of bed for toileting  - Record patient progress and toleration of activity level   Outcome: Progressing     Problem: DISCHARGE PLANNING  Goal: Discharge to home or other facility with appropriate resources  Description: INTERVENTIONS:  - Identify barriers to discharge w/patient and caregiver  - Arrange for needed discharge resources and transportation as appropriate  - Identify discharge learning needs (meds, wound care,  etc.)  - Arrange for interpretive services to assist at discharge as needed  - Refer to Case Management Department for coordinating discharge planning if the patient needs post-hospital services based on physician/advanced practitioner order or complex needs related to functional status, cognitive ability, or social support system  Outcome: Progressing     Problem: Knowledge Deficit  Goal: Patient/family/caregiver demonstrates understanding of disease process, treatment plan, medications, and discharge instructions  Description: Complete learning assessment and assess knowledge base.  Interventions:  - Provide teaching at level of understanding  - Provide teaching via preferred learning methods  Outcome: Progressing     Problem: Prexisting or High Potential for Compromised Skin Integrity  Goal: Skin integrity is maintained or improved  Description: INTERVENTIONS:  - Identify patients at risk for skin breakdown  - Assess and monitor skin integrity  - Assess and monitor nutrition and hydration status  - Monitor labs   - Assess for incontinence   - Turn and reposition patient  - Assist with mobility/ambulation  - Relieve pressure over bony prominences  - Avoid friction and shearing  - Provide appropriate hygiene as needed including keeping skin clean and dry  - Evaluate need for skin moisturizer/barrier cream  - Collaborate with interdisciplinary team   - Patient/family teaching  - Consider wound care consult   Outcome: Progressing

## 2024-07-22 NOTE — ASSESSMENT & PLAN NOTE
Noted cough on exam.  No reports of cough per staff in residential.  Patient became SOB abruptly after CT scan per ED provider, thought was due to flash pulm edema.  Patient was placed on 12L oxygen, was given Lasix 40 IV.  Weaned to oxygen 4L currently.  Satting mid 90s.  Check BNP, COVID flu RSV, procalcitonin  WBC 10.56.  Ordered Rocephin empirically  Robitussin as needed  Repeat CBC, procalcitonin in the morning

## 2024-07-22 NOTE — PLAN OF CARE
Problem: PAIN - ADULT  Goal: Verbalizes/displays adequate comfort level or baseline comfort level  Description: Interventions:  - Encourage patient to monitor pain and request assistance  - Assess pain using appropriate pain scale  - Administer analgesics based on type and severity of pain and evaluate response  - Implement non-pharmacological measures as appropriate and evaluate response  - Consider cultural and social influences on pain and pain management  - Notify physician/advanced practitioner if interventions unsuccessful or patient reports new pain  Outcome: Progressing     Problem: INFECTION - ADULT  Goal: Absence or prevention of progression during hospitalization  Description: INTERVENTIONS:  - Assess and monitor for signs and symptoms of infection  - Monitor lab/diagnostic results  - Monitor all insertion sites, i.e. indwelling lines, tubes, and drains  - Monitor endotracheal if appropriate and nasal secretions for changes in amount and color  - [place] appropriate cooling/warming therapies per order  - Administer medications as ordered  - Instruct and encourage patient and family to use good hand hygiene technique  - Identify and instruct in appropriate isolation precautions for identified infection/condition  Outcome: Progressing     Problem: SAFETY ADULT  Goal: Patient will remain free of falls  Description: INTERVENTIONS:  - Educate patient/family on patient safety including physical limitations  - Instruct patient to call for assistance with activity   - Consult OT/PT to assist with strengthening/mobility   - Keep Call bell within reach  - Keep bed low and locked with side rails adjusted as appropriate  - Keep care items and personal belongings within reach  - Initiate and maintain comfort rounds  - Make Fall Risk Sign visible to staff  - Offer Toileting every 2 Hours, in advance of need  - Initiate/Maintain bed alarm  - Obtain necessary fall risk management equipment: yellow bracelet  - Apply  yellow socks and bracelet for high fall risk patients  - Consider moving patient to room near nurses station  Outcome: Progressing  Goal: Maintain or return to baseline ADL function  Description: INTERVENTIONS:  -  Assess patient's ability to carry out ADLs; assess patient's baseline for ADL function and identify physical deficits which impact ability to perform ADLs (bathing, care of mouth/teeth, toileting, grooming, dressing, etc.)  - Assess/evaluate cause of self-care deficits   - Assess range of motion  - Assess patient's mobility; develop plan if impaired  - Assess patient's need for assistive devices and provide as appropriate  - Encourage maximum independence but intervene and supervise when necessary  - Involve family in performance of ADLs  - Assess for home care needs following discharge   - Consider OT consult to assist with ADL evaluation and planning for discharge  - Provide patient education as appropriate  Outcome: Progressing  Goal: Maintains/Returns to pre admission functional level  Description: INTERVENTIONS:  - Perform AM-PAC 6 Click Basic Mobility/ Daily Activity assessment daily.  - Set and communicate daily mobility goal to care team and patient/family/caregiver.   - Collaborate with rehabilitation services on mobility goals if consulted  - Perform Range of Motion 4 times a day.  - Reposition patient every 2 hours.  - Dangle patient 3 times a day  - Stand patient 3 times a day  - Ambulate patient 3 times a day  - Out of bed to chair 3 times a day   - Out of bed for meals 3 times a day  - Out of bed for toileting  - Record patient progress and toleration of activity level   Outcome: Progressing     Problem: DISCHARGE PLANNING  Goal: Discharge to home or other facility with appropriate resources  Description: INTERVENTIONS:  - Identify barriers to discharge w/patient and caregiver  - Arrange for needed discharge resources and transportation as appropriate  - Identify discharge learning needs  (meds, wound care, etc.)  - Arrange for interpretive services to assist at discharge as needed  - Refer to Case Management Department for coordinating discharge planning if the patient needs post-hospital services based on physician/advanced practitioner order or complex needs related to functional status, cognitive ability, or social support system  Outcome: Progressing     Problem: Knowledge Deficit  Goal: Patient/family/caregiver demonstrates understanding of disease process, treatment plan, medications, and discharge instructions  Description: Complete learning assessment and assess knowledge base.  Interventions:  - Provide teaching at level of understanding  - Provide teaching via preferred learning methods  Outcome: Progressing

## 2024-07-22 NOTE — OCCUPATIONAL THERAPY NOTE
OT EVALUATION       07/22/24 1026   OT Last Visit   OT Visit Date 07/22/24   Note Type   Note type Evaluation   Pain Assessment   Pain Assessment Tool 0-10   Pain Score No Pain   Restrictions/Precautions   Other Precautions Cognitive;Chair Alarm;Bed Alarm;Fall Risk   Home Living   Type of Home Assisted living  (per chart)   Additional Comments pt is a poor historian, unable to state home setup or prior level of function   Prior Function   Level of [place] Needs assistance with IADLS   Lives With Facility staff   Receives Help From Personal care attendant   IADLs Family/Friend/Other provides meals;Family/Friend/Other provides transportation;Family/Friend/Other provides medication management   General   Additional Pertinent History hx of dementia   ADL   Eating Assistance 5  Supervision/Setup   Grooming Assistance 5  Supervision/Setup   UB Bathing Assistance 4  Minimal Assistance   LB Bathing Assistance 4  Minimal Assistance   UB Dressing Assistance 4  Minimal Assistance   LB Dressing Assistance 4  Minimal Assistance   Toileting Assistance  4  Minimal Assistance   Transfers   Sit to Stand 4  Minimal assistance   Stand to Sit 4  Minimal assistance   Toilet transfer 4  Minimal assistance   Additional items Standard toilet   Functional Mobility   Functional Mobility 4  Minimal assistance   Additional Comments from bathroom with RW   Balance   Static Sitting Fair +   Dynamic Sitting Fair   Static Standing Fair   Dynamic Standing Fair -   Activity Tolerance   Activity Tolerance Other (Comment)  (cognition)   Nurse Made Aware yes   RUE Assessment   RUE Assessment WFL   LUE Assessment   LUE Assessment WFL   Cognition   Overall Cognitive Status Impaired   Arousal/Participation Cooperative   Attention Attends with cues to redirect   Orientation Level Oriented to person;Disoriented to place;Disoriented to time;Disoriented to situation   Following Commands Follows one step commands with increased time or repetition    Comments confused but pleasant   Assessment   Limitation Decreased ADL status;Decreased UE strength;Decreased Safe judgement during ADL;Decreased endurance;Decreased cognition;Decreased high-level ADLs;Decreased self-care trans  (decreased balance and mobility)   Prognosis Good   Assessment Patient evaluated by Occupational Therapy.  Patient admitted with NSTEMI (non-ST elevated myocardial infarction) (HCC).  The patients occupational profile, medical and therapy history includes a extensive additional review of physical, cognitive, or psychosocial history related to current functional performance.  Comorbidities affecting functional mobility and ADLS include: dementia, diabetes, and hypertension.  Prior to admission, patient was requiring assist for ADLS, requiring assist for IADLS, and an assisted living resident.  The evaluation identifies the following performance deficits: weakness, impaired balance, decreased endurance, increased fall risk, new onset of impairment of functional mobility, decreased ADLS, decreased IADLS, decreased activity tolerance, decreased safety awareness, impaired judgement, decreased cognition, and decreased strength, that result in activity limitations and/or participation restrictions. This evaluation requires clinical decision making of high complexity, because the patient presents with comorbidites that affect occupational performance and required significant modification of tasks or assistance with consideration of multiple treatment options.  The Barthel Index was used as a functional outcome tool presenting with a score of Barthel Index Score: 40, indicating marked limitations of functional mobility and ADLS.  The patient's raw score on the AM-PAC Daily Activity Inpatient Short Form is 19. A raw score of greater than or equal to 19 suggests the patient may benefit from discharge to home. Please refer to the recommendation of the Occupational Therapist for safe discharge  planning.  Patient will benefit from skilled Occupational Therapy services to address above deficits and facilitate a safe return to prior level of function.   Goals   Patient Goals unable to state due to cognitive impairment   STG Time Frame   (1-7 days)   Short Term Goal  Goals established to promote Patient Goals: unable to state due to cognitive impairment:  Grooming: supervision standing at sink; Bathing: supervision; Upper Body Dressing supervision; Lower Body Dressing: supervision; Toileting: supervision; Patient will increase ambulatory standard toilet transfer to supervision with least restrictive assistive device to increase performance and safety with ADLS and functional mobility; Patient will increase standing tolerance to 4 minutes during ADL task to decrease assistance level and decrease fall risk; Patient will increase bed mobility to supervision in preparation for ADLS and transfers; Patient will increase functional mobility to and from bathroom with least restrictive assistive device with supervision to increase performance with ADLS and to use a toilet; Patient will tolerate 5 minutes of UE ROM/strengthening to increase general activity tolerance and performance in ADLS/IADLS; Patient will improve functional activity tolerance to 10 minutes of sustained functional tasks to increase participation in basic self-care and decrease assistance level;  Patient will increase dynamic standing balance to fair to improve postural stability and decrease fall risk during standing ADLS and transfers.   LTG Time Frame   (8-14 days)   Long Term Goal Grooming: independent standing at sink; Bathing: independent; Upper Body Dressing independent; Lower Body Dressing: independent; Toileting: independent; Patient will increase ambulatory standard toilet transfer to independent with least restrictive assistive device to increase performance and safety with ADLS and functional mobility; Patient will increase standing  tolerance to 8 minutes during ADL task to decrease assistance level and decrease fall risk; Patient will increase bed mobility to independent in preparation for ADLS and transfers; Patient will increase functional mobility to and from bathroom with least restrictive assistive device independently to increase performance with ADLS and to use a toilet; Patient will tolerate 10 minutes of UE ROM/strengthening to increase general activity tolerance and performance in ADLS/IADLS; Patient will improve functional activity tolerance to 20 minutes of sustained functional tasks to increase participation in basic self-care and decrease assistance level;  Patient will increase dynamic standing balance to fair+ to improve postural stability and decrease fall risk during standing ADLS and transfers.  Pt will score >/= 23/24 on AM-PAC Daily Activity Inpatient scale to promote safe independence with ADLs and functional mobility; Pt will score >/= 70/100 on Barthel Index in order to decrease caregiver assistance needed and increase ability to perform ADLs and functional mobility.   Plan   Treatment Interventions ADL retraining;Functional transfer training;UE strengthening/ROM;Endurance training;Patient/family training;Equipment evaluation/education;Activityengagement;Compensatory technique education;Cognitive reorientation   Goal Expiration Date 08/05/24   OT Frequency 2-3x/wk   Discharge Recommendation   Rehab Resource Intensity Level, OT III (Minimum Resource Intensity)   AM-PAC Daily Activity Inpatient   Lower Body Dressing 3   Bathing 3   Toileting 3   Upper Body Dressing 3   Grooming 3   Eating 4   Daily Activity Raw Score 19   Daily Activity Standardized Score (Calc for Raw Score >=11) 40.22   AM-PAC Applied Cognition Inpatient   Following a Speech/Presentation 2   Understanding Ordinary Conversation 4   Taking Medications 1   Remembering Where Things Are Placed or Put Away 1   Remembering List of 4-5 Errands 1   Taking Care  of Complicated Tasks 1   Applied Cognition Raw Score 10   Applied Cognition Standardized Score 24.98   Barthel Index   Feeding 10   Bathing 0   Grooming Score 0   Dressing Score 5   Bladder Score 0  ([place] catheter)   Bowels Score 10   Toilet Use Score 5   Transfers (Bed/Chair) Score 10   Mobility (Level Surface) Score 0   Stairs Score 0   Barthel Index Score 40   Licensure   [place] License Number  Lashaun Mckinleysaranhector GREEN OTR/L [id number]

## 2024-07-22 NOTE — PROGRESS NOTES
"[facility]  Progress Note  Name: Paul Stack I  MRN: [id number]  Unit/Bed#: [address] [place] [address] I Date of Admission: 7/21/2024   Date of Service: 7/22/2024 I Hospital Day: 1    Assessment & Plan   Acute hypoxemic respiratory failure (HCC)  Assessment & Plan  Currently on 4L of O2 via NC. CT Chest did not reveal any acute pulmonary process.  NSTEMI/Cardiac workup as above.  Will wean from O2 as tolerated     Abnormal CT scan  Assessment & Plan  CT notes infrarenal abdominal aortic aneurysm measuring up to 3.3 cm just proximal to the aortic bifurcation.  Will require follow up imaging     PAD (peripheral artery disease) ([facility])  Assessment & Plan  CTA showed - Severe vessel disease or occlusion at the V2 segment of the proximal left vertebral artery.Severe stenosis with poststenotic dilatation at the celiac axis. 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  Refer to Vascular Surgery at discharge   Resume Asprin and Statin     Cough  Assessment & Plan  Pt was started on Ceftriaxone empirically during admission. CT reveals no evidence of pneumonia.  Procal level is WNL.  Pt has no leukocytosis and is afebrile.  Will discontinue Ceftriaxone and follow clinically     Dementia ([facility])  Assessment & Plan    Continue Namenda, Celexa and Depakote  Was agitated overnight and was given Seroquel along with virtual observation.  Can consider Psych evaluation if persistent     DM (diabetes mellitus) ([facility])  Assessment & Plan  No results found for: \"HGBA1C\"    Recent Labs     07/21/24  2340 07/22/24  0720   POCGLU 191* 143*         Blood Sugar Average: Last 72 hrs:  (P) 167    Resume SSI regimen     HTN (hypertension)  Assessment & Plan  Resume Norvasc and Metoprolol     Hyponatremia  Assessment & Plan  On Salt " tablet 1 g p.o. daily outpatient.  Will resume and follow BMP     * NSTEMI (non-ST elevated myocardial infarction) (HCC)  Assessment & Plan  Patient presents with chest pain.  Per staff at [facility], patient was seen shaking in the dining room after dinner, so staff attended to him and he reported he had chest pain with radiation to back.  911 was called.  EKG showed V3 V4 ST depression, patient was given 4 baby aspirin by EMS.  Per ED note, chest pain resolved after 1 dose of nitro and 324mg of aspirin.   Initial EKG showed sinus rhythm with occasional PVC, ST depression V2 through V6  Initial troponin 117.  Repeat troponin [id number]  Second EKG showed sinus rhythm with first-degree AV block, ST depression V1 through V6  Third EKG showed NSR, subtle ST depression V3 to V6, flat T wave in inferior leads, QTc 468.  CTA dissection protocol no evidence of aortic dissection  Currently on IV Heparin drip  Resume Asprin, Statin and Metoprolol  TTE pending  Resume telemetry monitoring  Cardiology consulted            VTE Pharmacologic Prophylaxis:    Heparin drip     Mobility:   Basic Mobility Inpatient Raw Score: 15  -HLM Goal: 4: Move to chair/commode  JH-HLM Achieved: 2: Bed activities/Dependent transfer  JH-HLM Goal NOT achieved. Continue with multidisciplinary rounding and encourage appropriate mobility to improve upon JH-HLM goals.    Patient Centered Rounds: I performed bedside rounds with nursing staff today.   Discussions with Specialists or Other Care Team Provider: Case Management     Education and Discussions with Family / Patient: Updated  (brother) via phone.    Total Time Spent on Date of Encounter in care of patient: 35 mins. This time was spent on one or more of the following: performing physical exam; counseling and coordination of care; obtaining or reviewing history; documenting in the medical record; reviewing/ordering tests, medications or procedures; communicating with other healthcare  professionals and discussing with patient's family/caregivers.    Current Length of Stay: 1 day(s)  Current Patient Status: Inpatient   Certification Statement: The patient will continue to require additional inpatient hospital stay due to NSTEMI; resp failure; cardiac workup  Discharge Plan: Anticipate discharge in 48 hrs to discharge location to be determined pending rehab evaluations.    Code Status: Level 1 - Full Code    Subjective:   Pt was seen and examined at bedside.  Was agitated overnight and required Seroquel.  Currently calm.  Denies any chest pain or shortness of breath at rest.       Objective:     Vitals:   Temp (24hrs), Av.5 °F (36.4 °C), Min:97.1 °F (36.2 °C), Max:98 °F (36.7 °C)    Temp:  [97.1 °F (36.2 °C)-98 °F (36.7 °C)] 97.1 °F (36.2 °C)  HR:  [] 53  Resp:  [13-20] 20  BP: ()/() 138/60  SpO2:  [93 %-99 %] 98 %  Body mass index is 23.7 kg/m².     Input and Output Summary (last 24 hours):     Intake/Output Summary (Last 24 hours) at 2024 0852  Last data filed at 2024 0352  Gross per 24 hour   Intake 1320 ml   Output 1300 ml   Net 20 ml       Physical Exam:   General: in no acute distress  Skin: no jaundice  CVS: RRR, murmur present  Lungs: CTAL, no wheezing or rales appreciated  Abdomen: soft, nondistended, bowel sounds normal, nontender upon palpation, no guarding or rebound tenderness  Extremities: no edema, no calf swelling or tenderness  Neuro: alert to person but not place or time; no tremors  Psych: calm, cooperative      Additional Data:     Labs:  Results from last 7 days   Lab Units 24  0351   WBC Thousand/uL 10.94*   HEMOGLOBIN g/dL 12.1   HEMATOCRIT % 36.2*   PLATELETS Thousands/uL 337   SEGS PCT % 70   LYMPHO PCT % 18   MONO PCT % 9   EOS PCT % 1     Results from last 7 days   Lab Units 24  0351 24  1906   SODIUM mmol/L 134* 135   POTASSIUM mmol/L 4.3 3.8   CHLORIDE mmol/L 101 101   CO2 mmol/L 24 19*   BUN mg/dL 19 20   CREATININE  mg/dL 0.76 0.81   ANION GAP mmol/L 9 15*   CALCIUM mg/dL 8.5 9.0   ALBUMIN g/dL  --  3.9   TOTAL BILIRUBIN mg/dL  --  0.35   ALK PHOS U/L  --  74   ALT U/L  --  9   AST U/L  --  12*   GLUCOSE RANDOM mg/dL 178* 196*     Results from last 7 days   Lab Units 07/21/24  1906   INR  1.06     Results from last 7 days   Lab Units 07/22/24  0720 07/21/24  2340   POC GLUCOSE mg/dl 143* 191*         Results from last 7 days   Lab Units 07/21/24  1906   PROCALCITONIN ng/ml <0.05       Lines/Drains:  Invasive Devices       Peripheral Intravenous Line  Duration             Peripheral IV 07/21/24 Right Antecubital 1 day    Peripheral IV 07/21/24 Left Antecubital <1 day    Peripheral IV 07/21/24 Left Forearm <1 day              Drain  Duration             External Urinary Catheter Small <1 day                      Telemetry:  Telemetry Orders (From admission, onward)               24 Hour Telemetry Monitoring  (ED Bridging Orders Panel)  Continuous x 24 Hours (Telem)        Question:  Reason for 24 Hour Telemetry  Answer:  Decompensated CHF- and any one of the following: continuous diuretic infusion or total diuretic dose >200 mg daily, associated electrolyte derangement (I.e. K < 3.0), ionotropic drip (continuous infusion), hx of ventricular arrhythmia, or new EF < 35%                              Imaging: Personally reviewed the following imaging: chest CT scan and abdominal/pelvic CT    Recent Cultures (last 7 days):         Last 24 Hours Medication List:   Current Facility-Administered Medications   Medication Dose Route Frequency Provider Last Rate    acetaminophen  650 mg Oral Q6H PRN Patrickibell Garrison, MANDEEPNP      amLODIPine  5 mg Oral Daily Cujavi Garrison, CRNP      aspirin  81 mg Oral Daily Attila Garrison, CRNP      atorvastatin  40 mg Oral Daily With Dinner Attila Garrison, CRNP      citalopram  20 mg Oral Daily Cujavi Garrison, CRNP      dextromethorphan-guaiFENesin  10 mL Oral Q4H PRN Cuiyin Nohemyrik, CRNP      divalproex sodium  250 mg  Oral Daily Cuiyin Yurik, CRNP      famotidine  20 mg Oral HS Cuiyin Yurik, CRNP      heparin (porcine)  3-30 Units/kg/hr (Order-Specific) Intravenous Titrated Cuiyin Yurik, CRNP 15 Units/kg/hr (07/22/24 0533)    heparin (porcine)  2,600 Units Intravenous Q6H PRN Cuiyin Yurik, CRNP      heparin (porcine)  5,200 Units Intravenous Q6H PRN Cuiyin Yurik, CRNP      insulin lispro  1-5 Units Subcutaneous TID AC Cuiyin Yurik, CRNP      insulin lispro  1-5 Units Subcutaneous HS Cuiyin Yurik, CRNP      memantine  10 mg Oral BID Cuiyin Yurik, CRNP      metoprolol tartrate  12.5 mg Oral Q12H CARLOS Cuiyin Yurik, CRNP      nitroglycerin  0.4 mg Sublingual Q5 Min PRN Cuiyin Yurik, CRNP      ondansetron  4 mg Intravenous Q6H PRN Cuiyin Yurik, CRNP      sodium chloride  1 g Oral Daily With Dinner Cuiyin Yurik, CRNP          Today, Patient Was Seen By: Enio Carlson MD    **Please Note: This note may have been constructed using a voice recognition system.**

## 2024-07-22 NOTE — PROGRESS NOTES
Patient with no improvement in behavior after taking Seroquel 12.5mg po. With multiple attempts of getting out of bed unassisted, pulling tubes, removing telemetry leads. Patient needs to be redirected/ reoriented every 5 minutes. Will trial on virtual observation. Hospitalist made aware. Order in placed. Maintain on safety and fall precaution. Continue plan of care.

## 2024-07-22 NOTE — ASSESSMENT & PLAN NOTE
CTA showed - Severe vessel disease or occlusion at the V2 segment of the proximal left vertebral artery.Severe stenosis with poststenotic dilatation at the celiac axis. 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  Refer to Vascular Surgery at discharge   Resume Asprin and Statin

## 2024-07-22 NOTE — PHYSICAL THERAPY NOTE
PHYSICAL THERAPY EVALUATION/TREATMENT     07/22/24 1115   PT Last Visit   PT Visit Date 07/22/24   Note Type   Note type Evaluation   Pain Assessment   Pain Assessment Tool Adames-Baker FACES   Adames-Baker FACES Pain Rating 0   Restrictions/Precautions   Other Precautions Cognitive;Chair Alarm;Bed Alarm;Fall Risk   Home Living   Type of Home Assisted living  ([facility])   Additional Comments Patient is a poor historian with limited information available in past documentation.  Patient appears to be ambulatory prior to admission possibly with roller walker   Prior Function   Lives With Facility staff   Receives Help From Personal care attendant   IADLs Family/Friend/Other provides transportation;Family/Friend/Other provides meals;Family/Friend/Other provides medication management   Comments patient is a poor historian   General   Additional Pertinent History Chart reviewed, patient admitted with NSTEMI, chest pain.  Patient from assisted living facility.  Now presents as cooperative tolerating gait well with a roller walker   Family/Caregiver Present No   Cognition   Overall Cognitive Status Impaired   Arousal/Participation Cooperative   Attention Attends with cues to redirect   Orientation Level Oriented to person   Following Commands Follows one step commands with increased time or repetition   Subjective   Subjective Patient reports no pain   RLE Assessment   RLE Assessment   (Range of motion within functional limits, strength 3+/4 -)   LLE Assessment   LLE Assessment   (Range of motion within functional limits, strength 3+/4 -)   Coordination   Movements are Fluid and Coordinated 0   Coordination and Movement Description Decreased coordination with standing, transfers, gait activity   Bed Mobility   Additional Comments Patient sitting out of bed in bedside chair upon arrival by therapist   Transfers   Sit to Stand 4  Minimal assistance   Additional items Assist x 1;Verbal cues   Stand to Sit 4  Minimal  assistance   Additional items Assist x 1;Verbal cues   Ambulation/Elevation   Gait Assistance   (min to mod assist)   Assistive Device   (none/handhold)   Distance 10 feet with change in direction, unsteady gait with reaching for furniture   Balance   Static Sitting Fair +   Dynamic Sitting Fair   Static Standing Fair   Dynamic Standing Fair -   Ambulatory Fair -   Activity Tolerance   Activity Tolerance Patient limited by fatigue  (Limited by cognition)   Nurse Made Aware Yes   Assessment   Prognosis Good   Problem List Decreased strength;Decreased range of motion;Decreased endurance;Impaired balance;Decreased mobility;Decreased coordination;Decreased cognition;Impaired judgement;Decreased safety awareness   Assessment Patient seen for Physical Therapy evaluation. Patient admitted with NSTEMI (non-ST elevated myocardial infarction) (HCC).  Comorbidities affecting patient's physical performance include: .  Personal factors affecting patient at time of initial evaluation include: inability to ambulate household distances, inability to navigate community distances, inability to navigate level surfaces without external assistance, inability to perform dynamic tasks in community, decreased cognition, positive fall history, inability to perform physical activity, limited insight into impairments, inability to perform ADLS, inability to perform IADLS , and inability to live alone. Prior to admission, patient was independent with functional mobility with ?walker, requiring assist for ADLS, requiring assist for IADLS, ambulating household distance, ambulating community distances, an assisted living resident, and having 24 hour care .  Please find objective findings from Physical Therapy assessment regarding body systems outlined above with impairments and limitations including weakness, decreased ROM, impaired balance, decreased endurance, impaired coordination, gait deviations, decreased activity tolerance, decreased  functional mobility tolerance, decreased safety awareness, impaired judgement, fall risk, and decreased cognition.  The Barthel Index was used as a functional outcome tool presenting with a score of Barthel Index Score: 40 today indicating marked limitations of functional mobility and ADLS.  Patient's clinical presentation is currently unstable/unpredictable as seen in patient's presentation of vital sign response, changing level of pain, varying levels of cognitive performance, increased fall risk, new onset of impairment of functional mobility, decreased endurance, and new onset of weakness. Pt would benefit from continued Physical Therapy treatment to address deficits as defined above and maximize level of functional mobility. As demonstrated by objective findings, the assigned level of complexity for this evaluation is high.The patient's [facility] Basic Mobility Inpatient Short Form Raw Score is 16. A Raw score of less than or equal to 16 suggests the patient may benefit from discharge to post-acute rehabilitation services. Please also refer to the recommendation of the Physical Therapist for safe discharge planning.   Goals   Patient Goals None stated, patient confused   STG Expiration Date 07/29/24   Short Term Goal #1 Transfers and gait with roller walker with supervison   Short Term Goal #2 Gait endurance to 50 feet   LTG Expiration Date 08/05/24   Long Term Goal #1 Strength bilateral lower extremities 4/5   Long Term Goal #2 Transfers and gait with roller walker with supervision to independent for functional facility distances   Plan   Treatment/Interventions ADL retraining;Functional transfer training;LE strengthening/ROM;Therapeutic exercise;Endurance training;Cognitive reorientation;Patient/family training;Equipment eval/education;Bed mobility;Gait training;Compensatory technique education   PT Frequency Other (Comment)  (5 times per week)   Discharge Recommendation   Rehab Resource Intensity Level, PT III  (Minimum Resource Intensity)   AM-PAC Basic Mobility Inpatient   Turning in Flat Bed Without Bedrails 3   Lying on Back to Sitting on Edge of Flat Bed Without Bedrails 3   Moving Bed to Chair 3   Standing Up From Chair Using Arms 3   Walk in Room 2   Climb 3-5 Stairs With Railing 2   Basic Mobility Inpatient Raw Score 16   Basic Mobility Standardized Score 38.32   [facility] Highest Level Of Mobility   -[facility] Goal 5: Stand one or more mins   -HL Achieved 6: Walk 10 steps or more   Barthel Index   Feeding 10   Bathing 0   Grooming Score 0   Dressing Score 5   Bladder Score 0   Bowels Score 10   Toilet Use Score 5   Transfers (Bed/Chair) Score 10   Mobility (Level Surface) Score 0   Stairs Score 0   Barthel Index Score 40   Additional Treatment Session   Start Time 1100   End Time 1115   Treatment Assessment s: Patient without pain O: Bilateral lower extremity exercise completed as listed below.  Gait training with rolling walker with supervision to min assist for 20 feet A: Patient tolerating use of roller walker well with cueing for direction and steering at times.  Patient will benefit from continued physical therapy with progression as tolerated and increasing functional mobility with clinical staff as well   Exercises   Hip Flexion Sitting;10 reps;Bilateral  (Alternating)   Hip Abduction Sitting;10 reps;Bilateral  (Alternating)   Knee AROM Long Arc Quad Sitting;10 reps;Bilateral  (Alternating)   Ankle Pumps Sitting;20 reps;Bilateral  (Alternating)   Balance training  Sidestepping and backward stepping completed for balance and coordination   Licensure   [place] License Number  Amnada Malin, PT 4 0 [id number]

## 2024-07-22 NOTE — ASSESSMENT & PLAN NOTE
Currently on 4L of O2 via NC. CT Chest did not reveal any acute pulmonary process.  NSTEMI/Cardiac workup as above.  Will wean from O2 as tolerated

## 2024-07-22 NOTE — ASSESSMENT & PLAN NOTE
"No results found for: \"HGBA1C\"    Recent Labs     07/21/24  2340   POCGLU 191*       Blood Sugar Average: Last 72 hrs:  (P) 191    On metformin in JEFFRY.  Hold while inpatient.  Glucose 191  SSI  Check A1c  "

## 2024-07-22 NOTE — ASSESSMENT & PLAN NOTE
CT notes infrarenal abdominal aortic aneurysm measuring up to 3.3 cm just proximal to the aortic bifurcation.  Will require follow up imaging

## 2024-07-22 NOTE — CONSULTS
Consultation - Cardiology   Paul Stack 84 y.o. male MRN: [id number]  Unit/Bed#: [address] [place] [address] Encounter: [id number]    Assessment & Plan     Assessment:  1. NSTEMI  2. Hypertension  3. Diabetes  4. Dyslipidemia  5. PAD  6. Dementia    Plan:  Patient has been admitted to the hospital service  1. Continue trend troponins until they peak    2. Continue IV heparin ACS protocol for full 48 hours    3. Lipids obtain this morning, total cholesterol was 297, triglycerides are 90, HDL 39 and . Will increase Lipitor to 80 mg daily, recommend follow-up lipid panel and LFTs in six weeks in the outpatient setting    4. Beta blocker currently held secondary to bradycardia. Continue to monitor telemetry    5. Continue aspirin 81 mg once a day and Norvasc 5 mg daily as he was taking prehospital    6. Patient unable to tell me who his POA is. I have reached out to case management to see if they can identify the responsible party to follow-up with further testing for management of his elevated troponins.    7. Further orders as patient condition and testing warrant      History of Present Illness   Physician Requesting Consult: Enio Carlson MD  Reason for Consult / Principal Problem: Chest pain and abnormal troponins      HPI: Paul Stack is a 84 y.o. year old male who presented to the emergency room from [facility] after complaining of chest discomfort 6/10 at dinner time. He notes the pain started in the center of his chest and radiated to between his shoulder blades. He received one dose of sublingual nitroglycerin and full strength aspirin by S and arrived chest pain free to the emergency room. Patient is a fair historian secondary to dementia. Initial high-sensitivity troponin was 117, it is peaked at 4405 and Random troponin this morning is currently pending. 12 lead EKG demonstrated significant ST depression seen in the precordial leads.     Patient states he has never experienced this discomfort  before. He previously followed with Dr. Prater, cardiology in [facility] for history of hypertension and dyslipidemia. Patient is other history is for diabetes, PVCs and depression.    Other testing performed in the emergency room CT of the abdomen demonstrateda infrarenal AAA of 3.3 cm. 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        Inpatient consult to Cardiology  Consult performed by: PEPITO Avila  Consult ordered by: PEPITO Caal          Review of Systems   Constitutional: Negative.  Negative for activity change and fatigue.   HENT: Negative.  Negative for congestion, facial swelling and sinus pain.    Eyes: Negative.  Negative for photophobia and visual disturbance.   Respiratory: Negative.  Negative for chest tightness and shortness of breath.    Cardiovascular:  Positive for chest pain. Negative for palpitations and leg swelling.   Gastrointestinal: Negative.  Negative for abdominal distention, constipation, diarrhea, nausea and vomiting.   Endocrine: Negative.  Negative for polydipsia, polyphagia and polyuria.   Genitourinary: Negative.  Negative for difficulty urinating.   Musculoskeletal: Negative.    Skin: Negative.    Neurological: Negative.  Negative for dizziness, syncope, weakness and light-headedness.   Hematological: Negative.    Psychiatric/Behavioral: Negative.         Historical Information   Past Medical History:   Diagnosis Date    Dementia (HCC)     DM (diabetes mellitus) (HCC)     HTN (hypertension)     Hyponatremia      History reviewed. No pertinent surgical history.  Social History     Substance and Sexual Activity   Alcohol Use Never     Social History     Substance and Sexual Activity   Drug Use Not on file     E-Cigarette/Vaping    E-Cigarette Use Unknown If Ever Used       E-Cigarette/Vaping Substances     Social History     Tobacco Use   Smoking Status Unknown   Smokeless Tobacco Not on file     Family History: History reviewed. No pertinent family history.    Meds/Allergies   all current active meds have been reviewed, current meds:   Current Facility-Administered Medications   Medication Dose Route Frequency    acetaminophen (TYLENOL) tablet 650 mg  650 mg Oral Q6H PRN    amLODIPine (NORVASC) tablet 5 mg  5 mg Oral Daily    aspirin chewable tablet 81 mg  81 mg Oral Daily    atorvastatin (LIPITOR) tablet 40 mg  40 mg Oral Daily With Dinner    citalopram (CeleXA) tablet 20 mg  20 mg Oral Daily    dextromethorphan-guaiFENesin (ROBITUSSIN DM) oral syrup 10 mL  10 mL Oral Q4H PRN    divalproex sodium (DEPAKOTE) DR tablet 250 mg  250 mg Oral Daily    famotidine (PEPCID) tablet 20 mg  20 mg Oral HS    heparin (porcine) 25,000 units in 0.45% NaCl 250 mL infusion (premix)  3-30 Units/kg/hr (Order-Specific) Intravenous Titrated    heparin (porcine) injection 2,600 Units  2,600 Units Intravenous Q6H PRN    heparin (porcine) injection 5,200 Units  5,200 Units Intravenous Q6H PRN    insulin lispro (HumALOG/ADMELOG) 100 units/mL subcutaneous injection 1-5 Units  1-5 Units Subcutaneous TID AC    insulin lispro (HumALOG/ADMELOG) 100 units/mL subcutaneous injection 1-5 Units  1-5 Units Subcutaneous HS    memantine (NAMENDA) tablet 10 mg  10 mg Oral BID    metoprolol tartrate (LOPRESSOR) partial tablet 12.5 mg  12.5 mg Oral Q12H CARLSO    nitroglycerin (NITROSTAT) SL tablet 0.4 mg  0.4 mg Sublingual Q5 Min PRN    ondansetron (ZOFRAN) injection 4 mg  4 mg Intravenous Q6H PRN    sodium chloride tablet 1 g  1 g Oral Daily With Dinner   , and PTA meds:   Prior to Admission Medications   Prescriptions Last Dose Informant Patient Reported? Taking?   Memantine HCl ER 21 MG CP24 7/21/2024  Yes Yes   Sig: Take 28 mg by mouth daily   Turmeric 500 MG CAPS 7/21/2024 Outside Facility (Specify) Yes Yes   Sig:  "Take 500 mg by mouth daily with dinner   acetaminophen (TYLENOL) 325 mg tablet Unknown Outside Facility (Specify) Yes No   Sig: Take 650 mg by mouth every 4 (four) hours as needed for mild pain or fever   amLODIPine (NORVASC) 2.5 mg tablet 7/21/2024  Yes Yes   Sig: Take 2.5 mg by mouth daily   citalopram (CeleXA) 20 mg tablet 7/21/2024  Yes Yes   Sig: Take 20 mg by mouth daily   dapagliflozin (Farxiga) 5 MG TABS 7/21/2024  Yes Yes   Sig: Take 5 mg by mouth daily   divalproex sodium (DEPAKOTE) 250 mg DR tablet 7/21/2024  Yes Yes   Sig: Take 250 mg by mouth daily   metFORMIN (GLUCOPHAGE) 500 mg tablet 7/21/2024  Yes Yes   Sig: Take 500 mg by mouth 2 (two) times a day with meals   sodium chloride 1 g tablet 7/21/2024  Yes Yes   Sig: Take 1 g by mouth daily with dinner      Facility-Administered Medications: None     No Known Allergies    Objective   Vitals: Blood pressure 138/60, pulse (!) 53, temperature (!) 97.1 °F (36.2 °C), temperature source Oral, resp. rate 20, height 5' 6\" (1.676 m), weight 66.6 kg (146 lb 13.2 oz), SpO2 98%.  Orthostatic Blood Pressures      Flowsheet Row Most Recent Value   Blood Pressure 138/60 filed at 07/22/2024 0803   Patient Position - Orthostatic VS Lying filed at 07/21/2024 2200              Intake/Output Summary (Last 24 hours) at 7/22/2024 1007  Last data filed at 7/22/2024 0352  Gross per 24 hour   Intake 1320 ml   Output 1300 ml   Net 20 ml       Invasive Devices       Peripheral Intravenous Line  Duration             Peripheral IV 07/21/24 Right Antecubital 1 day    Peripheral IV 07/21/24 Left Antecubital <1 day    Peripheral IV 07/21/24 Left Forearm <1 day              Drain  Duration             External Urinary Catheter Small <1 day                    Physical Exam  Vitals and nursing note reviewed.   Constitutional:       General: He is not in acute distress.     Appearance: Normal appearance. He is normal weight.   HENT:      Right Ear: External ear normal.      Left Ear: " External ear normal.   Eyes:      General: No scleral icterus.        Right eye: No discharge.         Left eye: No discharge.   Cardiovascular:      Rate and Rhythm: Normal rate and regular rhythm.      Pulses: Normal pulses.   Pulmonary:      Effort: Pulmonary effort is normal. No respiratory distress.      Breath sounds: Normal breath sounds.   Abdominal:      General: Bowel sounds are normal. There is no distension.      Palpations: Abdomen is soft.   Musculoskeletal:      Right lower leg: No edema.      Left lower leg: No edema.   Skin:     General: Skin is warm and dry.      Capillary Refill: Capillary refill takes less than 2 seconds.   Neurological:      General: No focal deficit present.      Mental Status: He is alert. Mental status is at baseline.      Comments: Fair historian    Psychiatric:         Mood and Affect: Mood normal.         Lab Results: I have personally reviewed pertinent lab results.    CBC with diff:   Results from last 7 days   Lab Units 07/22/24  0351   WBC Thousand/uL 10.94*   RBC Million/uL 3.90   HEMOGLOBIN g/dL 12.1   HEMATOCRIT % 36.2*   MCV fL 93   MCH pg 31.0   MCHC g/dL 33.4   RDW % 13.5   MPV fL 9.7   PLATELETS Thousands/uL 337     CMP:   Results from last 7 days   Lab Units 07/22/24  0351 07/21/24  1906   SODIUM mmol/L 134* 135   CHLORIDE mmol/L 101 101   CO2 mmol/L 24 19*   BUN mg/dL 19 20   CREATININE mg/dL 0.76 0.81   CALCIUM mg/dL 8.5 9.0   AST U/L  --  12*   ALT U/L  --  9   ALK PHOS U/L  --  74   EGFR ml/min/1.73sq m 82 80     HS Troponin:   0   Lab Value Date/Time    HSTNI 4,485 (H) 07/22/2024 0352    HSTNI0 117 (H) 07/21/2024 1906    HSTNI2 403 (H) 07/21/2024 2109    HSTNI4 1,702 (H) 07/21/2024 2331     BNP:   Results from last 7 days   Lab Units 07/22/24  0351   POTASSIUM mmol/L 4.3   CHLORIDE mmol/L 101   CO2 mmol/L 24   BUN mg/dL 19   CREATININE mg/dL 0.76   CALCIUM mg/dL 8.5   EGFR ml/min/1.73sq m 82     Coags:   Results from last 7 days   Lab Units 07/22/24  0121  07/21/24  1906   PTT seconds 174* 24   INR   --  1.06     TSH:   Results from last 7 days   Lab Units 07/22/24  0351   TSH 3RD GENERATON uIU/mL 1.888     Magnesium:   Results from last 7 days   Lab Units 07/22/24  0351   MAGNESIUM mg/dL 2.2     Lipid Profile:   Results from last 7 days   Lab Units 07/22/24  0351   HDL mg/dL 39*   LDL CALC mg/dL 240*   TRIGLYCERIDES mg/dL 90     Imaging: I have personally reviewed pertinent reports.    EKG: lead EKG demonstrates profound ST segment depression of more than 3 mm in the precordial leads with frequent unit focal PVCs  VTE Prophylaxis: Sequential compression device (Venodyne)  and Heparin    Code Status: Level 1 - Full Code  Advance Directive and Living Will:      Power of :    POLST:      Radha BEYER  Cardiology

## 2024-07-22 NOTE — CASE MANAGEMENT
Case Management Assessment & Discharge Planning Note    Patient name Paul Stack  Location [address] [place] [facility]/[place] [facility]-* MRN [id number]  : 1940 Date 2024       Current Admission Date: 2024  Current Admission Diagnosis:NSTEMI (non-ST elevated myocardial infarction) (HCC)   Patient Active Problem List    Diagnosis Date Noted Date Diagnosed    Cough 2024     PAD (peripheral artery disease) (HCC) 2024     Abnormal CT scan 2024     Acute hypoxemic respiratory failure (HCC) 2024     NSTEMI (non-ST elevated myocardial infarction) (HCC) 2024     Hyponatremia      HTN (hypertension)      DM (diabetes mellitus) (HCC)      Dementia (HCC)        LOS (days): 1  Geometric Mean LOS (GMLOS) (days):   Days to GMLOS:     OBJECTIVE:    Risk of Unplanned Readmission Score: 15.54         Current admission status: Inpatient       Preferred Pharmacy:   [facility] [address] [facility] [address]  Phone: [phone] Fax: [phone]    Primary Care Provider: No primary care provider on file.    Primary Insurance: [employer]  Secondary Insurance: [employer]  REP    ASSESSMENT:  Active Health Care Proxies    There are no active Health Care Proxies on file.                 Readmission Root Cause  30 Day Readmission: No    Patient Information  Admitted from:: Facility ([facility]  Mental Status: Confused  During Assessment patient was accompanied by: Not accompanied during assessment  Assessment information provided by:: Brother  Primary Caregiver:  ([facility])  Support Systems: Self, Home care staff, Family members  County of Residence: Other (specify in comment box) ([place])  What city do you live in?: [place]  Home entry access options. Select all that apply.: No steps to enter home  Type of Current Residence: Facility ([facility])  Upon entering residence, is there a bedroom on the  main floor (no further steps)?: Yes  Upon entering residence, is there a bathroom on the main floor (no further steps)?: Yes  Living Arrangements: Lives Alone, Other (Comment) (Lives alone at [facility])    Activities of Daily Living Prior to Admission  Functional Status: Assistance  Completes ADLs independently?: No  Level of ADL dependence: Assistance  Ambulates independently?: Yes  Does patient use assisted devices?: Yes  Assisted Devices (DME) used: Straight Cane  Does patient currently own DME?: Yes  What DME does the patient currently own?: Straight Cane  Does patient have a history of Outpatient Therapy (PT/OT)?: No  Does the patient have a history of Short-Term Rehab?: No  Does patient have a history of HHC?: No  Does patient currently have HHC?: No         Patient Information Continued  Income Source: SSI/SSD  Does patient have prescription coverage?: Yes  Does patient receive dialysis treatments?: No  Does patient have a history of substance abuse?: No  Does patient have a history of Mental Health Diagnosis?: No         Means of Transportation  [facility] Other (Comment) ([facility] transports when needed.)      Social Determinants of Health (SDOH)      Flowsheet Row Most Recent Value   Housing Stability    In the last 12 months, was there a time when you were not able to pay the mortgage or rent on time? N   In the past 12 months, how many times have you moved where you were living? 0   At any time in the past 12 months, were you homeless or living in a shelter (including now)? N   Transportation Needs    In the past 12 months, has lack of transportation kept you from medical appointments or from getting medications? no   In the past 12 months, has lack of transportation kept you from meetings, work, or from getting things needed for daily living? No   Food Insecurity    Within the past 12 months, you worried that your food would run out before you got the money to buy more. Never true    Within the past 12 months, the food you bought just didn't last and you didn't have money to get more. Never true   Utilities    In the past 12 months has the electric, gas, oil, or water company threatened to shut off services in your home? No            DISCHARGE DETAILS:    Discharge planning discussed with:: Patient's brotherSanjay, via phone call.  Freedom of Choice: Yes  Comments - Freedom of Choice: PT/OT evals pending.  CM contacted family/caregiver?: Yes  Were Treatment Team discharge recommendations reviewed with patient/caregiver?: Yes  Did patient/caregiver verbalize understanding of patient care needs?: Yes  Were patient/caregiver advised of the risks associated with not following Treatment Team discharge recommendations?: Yes    Contacts  Patient Contacts: Sanjay-brothkenya  Relationship to Patient:: Family  Contact Method: Phone  Phone Number: [phone]  Reason/Outcome: Continuity of Care, Emergency Contact, Discharge Planning    Requested Home Health Care         Is the patient interested in HHC at discharge?: No    DME Referral Provided  Referral made for DME?: No    Other Referral/Resources/Interventions Provided:  Referral Comments: No referrals sent at this time. PT/OT evals pending.    Additional Comments: CM contacted pt's brother, Sanjay, via phone call and introduced self and role. Sanjay reports pt resides at [facility]. Per Sanjay pt uses a cane for ambulation. Pt needs assistance with ADL's. Pt does not have hx of STR or HHC. Pt does not have hx of MH or substance abuse. PT/OT evals pending. CM department to continue to follow discharge planning needs. Discharge plan is for pt to return to [facility].

## 2024-07-22 NOTE — ASSESSMENT & PLAN NOTE
Patient presents with chest pain.  Per staff at [facility], patient was seen shaking in the dining room after dinner, so staff attended to him and he reported he had chest pain with radiation to back.  911 was called.  EKG showed V3 V4 ST depression, patient was given 4 baby aspirin by EMS.  Per ED note, chest pain resolved after 1 dose of nitro and 324mg of aspirin.   Initial EKG showed sinus rhythm with occasional PVC, ST depression V2 through V6  Initial troponin 117.  Repeat troponin 403-1702  Second EKG showed sinus rhythm with first-degree AV block, ST depression V1 through V6  Third EKG showed NSR, subtle ST depression V3 to V6, flat T wave in inferior leads, QTc 468.  Unknown personal or family history of MI.  Attempted to contact brother over the phone, no answer.  CTA dissection protocol no evidence of aortic dissection.  Suspect NSTEMI  ED provider discussed case with cardiology on-call, recommend heparin drip which was started in ED and keep SBP < 150.  Patient was already given aspirin 324mg p.o. by EMS.  Will continue baby aspirin daily starting tomorrow.  Start low-dose metoprolol and Lipitor 40 mg p.o. daily  Nitrostat as needed for chest pain  Trend troponin  Lipid panel, A1c, TSH free T4  2D echo  Telemetry  N.p.o. after midnight  Consult cardiology

## 2024-07-22 NOTE — UTILIZATION REVIEW
"Initial Clinical Review    Admission: Date/Time/Statement:   Admission Orders (From admission, onward)       Ordered        07/21/24 2204  INPATIENT ADMISSION  Once                          Orders Placed This Encounter   Procedures    INPATIENT ADMISSION     Standing Status:   Standing     Number of Occurrences:   1     Order Specific Question:   Level of Care     Answer:   Level 2 Stepdown / HOT [14]     Order Specific Question:   Estimated length of stay     Answer:   More than 2 Midnights     Order Specific Question:   Certification     Answer:   I certify that inpatient services are medically necessary for this patient for a duration of greater than two midnights. See H&P and MD Progress Notes for additional information about the patient's course of treatment.     ED Arrival Information       Expected   -    Arrival   7/21/2024 18:57    Acuity   Urgent              Means of arrival   Ambulance    Escorted by   [facility]    Service   Hospitalist    Admission type   Emergency              Arrival complaint   chest pain             Chief Complaint   Patient presents with    Chest Pain     Patient from [facility], hx severe dementia per squad. Patient started with 6/10 substernal CP radiating in between shoulder blades starting \"around dinner time\" per facility. Patient had some depressions on 12 lead per squad which apparently resolved, along with CP, after 1 dose of nitro and 324mg of aspirin. Patient denies CP at this time.        Initial Presentation:   84 yom to ER from nursing facility via EMS for chest pain radiating to back. Hx type 2 diabetes, hypertension, dementia, hyponatremia. Admission Initial EKG showed sinus rhythm with occasional PVC, ST depression V2 through V6. Initial troponin 117.  Repeat troponin [id number]. Second EKG showed sinus rhythm with first-degree AV block, ST depression V1 through V6. Third EKG showed NSR, subtle ST depression V3 to V6, flat T wave " in inferior leads, QTc 468. CTA dissection protocol no evidence of aortic dissection. Pt became SOB abruptly after CT scan, thought was due to flash pulm edema, patient was placed on oxygen 12 L and was given Lasix 40 mg IV.  Labs: WBC 10.56 CO2 19, anion gap 15, Mg 1.6, ast 12, elevated troponin, d-dimer 0.69, .   Admitted to inpatient status for NSTEMI. Started on IV Heparin gtt, IV diuresis.     Anticipated Length of Stay/Certification Statement:   Patient will be admitted on an Inpatient basis with an anticipated length of stay of  > 2 midnights.   Justification for Hospital Stay: NSTEMI     Date: 7/22/24   Day 2:   NSTEMI, remains on IV heparin gtt, echo today, mgt per cardio. Pt was started on Ceftriaxone empirically during admission. CT reveals no evidence of pneumonia. Procal level is WNL. Pt has no leukocytosis, afebrile; d/c Ceftriaxone and follow clinically. Pt agitated overnight and was given Seroquel along with virtual observation.     Per cardio: NSTEMI  Continue IV Heparin. Trend troponin until peak. Increase Lipitor. Beta blocker held 2nd bradycardia, continue telemetry. Cotninue ASA, Norvasc.       ED Triage Vitals   Temperature Pulse Respirations Blood Pressure SpO2 Pain Score   07/21/24 2140 07/21/24 1900 07/21/24 1900 07/21/24 1900 07/21/24 1900 07/21/24 2242   (!) 97.4 °F (36.3 °C) 91 18 166/78 96 % No Pain     Weight (last 2 days)       Date/Time Weight    07/22/24 0708 66.6 (146.83)    07/21/24 22:42:31 66.6 (146.8)    07/21/24 2140 67.2 (148.15)            Vital Signs (last 3 days)       Date/Time Temp Pulse Resp BP MAP (mmHg) SpO2 Calculated FIO2 (%) - Nasal Cannula O2 Flow Rate (L/min) Nasal Cannula O2 Flow Rate (L/min) O2 Device Patient Position - Orthostatic VS Deric Coma Scale Score Pain    07/22/24 08:03:54 97.1 °F (36.2 °C) 53 20 138/60 86 98 % -- -- -- None (Room air) -- 14 No Pain    07/22/24 0708 -- 62 -- 168/70 -- 99 % -- -- -- -- -- -- --    07/22/24 0624 -- -- -- -- --  -- 196 -- 44 L/min Nasal cannula -- -- --    07/22/24 05:47:52 -- 68 17 168/70 103 97 % -- -- -- -- -- -- --    07/22/24 0445 -- 56 -- 120/55 77 98 % -- -- -- -- -- -- --    07/22/24 0345 -- 58 -- 115/55 75 97 % -- -- -- -- -- -- --    07/22/24 0245 -- 59 -- 108/53 71 96 % 36 -- 4 L/min Nasal cannula -- 14 --    07/22/24 0145 -- 61 -- 100/52 68 98 % -- -- -- -- -- -- --    07/22/24 0045 -- 74 -- 93/55 68 94 % -- -- -- -- -- -- --    07/22/24 00:36:51 -- 67 18 173/76 108 98 % -- -- -- -- -- -- --    07/21/24 2345 -- 70 -- 162/68 99 98 % -- -- -- -- -- -- --    07/21/24 2249 -- -- -- -- -- -- -- -- -- -- -- 14 --    07/21/24 22:42:31 98 °F (36.7 °C) 67 16 163/69 100 96 % 36 -- 4 L/min Nasal cannula -- -- No Pain    07/21/24 2215 -- 73 20 178/72 104 94 % 44 -- 6 L/min Nasal cannula -- -- --    07/21/24 2200 -- 65 20 127/62 88 97 % -- -- -- Mid flow nasal cannula Lying -- --    07/21/24 2159 -- -- -- -- -- -- -- -- -- -- -- 15 --    07/21/24 2140 97.4 °F (36.3 °C) 84 -- 181/79 -- -- -- -- -- -- -- -- --    07/21/24 2137 -- 92 20 163/131 142 96 % -- 12 L/min -- Mid flow nasal cannula -- -- --    07/21/24 2115 -- 101 19 138/64 92 93 % -- 10 L/min -- Mid flow nasal cannula Lying -- --    07/21/24 2100 -- -- -- -- -- -- -- 10 L/min -- Mid flow nasal cannula -- -- --    07/21/24 2000 -- 73 13 144/66 95 94 % -- -- -- None (Room air) Lying -- --    07/21/24 1930 -- 84 16 167/74 107 96 % -- -- -- None (Room air) Lying -- --    07/21/24 1915 -- 84 18 180/77 110 96 % -- -- -- None (Room air) Lying -- --    07/21/24 1900 -- 91 18 166/78 112 96 % -- -- -- None (Room air) Lying -- --              Pertinent Labs/Diagnostic Test Results:   Radiology:  CTA dissection protocol chest/abdomen/pelvis   Final Interpretation by Beth Wallace MD (07/21 2129)      No evidence of aortic dissection.      Infrarenal abdominal aortic aneurysm measuring up to 3.3 cm just proximal to the aortic bifurcation      Severe vessel disease as detailed  above involving the left vertebral artery, celiac axis and left common femoral artery.         Workstation performed: [id number]         XR chest 1 view portable   Final Interpretation by Naeem Chavez MD (07/22 0858)      No acute cardiopulmonary disease.            Workstation performed: [id number]         XR chest portable    (Results Pending)     Cardiology:  Echo complete w/ contrast if indicated   Final Result by Angelo Boateng MD (07/22 5717)        Left Ventricle: Left ventricular cavity size is normal. Wall thickness    is mildly increased. The left ventricular ejection fraction is 45% by    visual estimation. Systolic function is mildly reduced. Severe hypokinesis    of basal-mid inferior and inferolaterla walls Diastolic function is    abnormal.     Right Ventricle: Systolic function is normal. Normal tricuspid annular    plane systolic excursion (TAPSE) > 1.7 cm.     Left Atrium: The atrium is mildly dilated.     Aortic Valve: There is mild to moderate regurgitation.     Mitral Valve: There is mild regurgitation.         ECG 12 lead   Final Result by Marilin Vasquez MD (07/22 0760)   Normal sinus rhythm   Nonspecific ST and T wave abnormality   Prolonged QT   Abnormal ECG      Confirmed by Marilin Vasquez ([id number]) on 7/22/2024 7:48:59 AM      ECG 12 lead   Final Result by Marilin Vasquez MD (07/22 0755)   Sinus rhythm with 1st degree A-V block   Marked ST abnormality, possible anteroseptal subendocardial injury   Abnormal ECG   Confirmed by Marilin Vasquez ([id number]) on 7/22/2024 7:51:35 AM      ECG 12 lead   Final Result by Marilin Vasquez MD (07/22 0756)   Sinus rhythm , PVC/Fusion beat   Marked ST abnormality, possible anteroseptal subendocardial injury   Abnormal ECG   Confirmed by Marilin Vasquez ([id number]) on 7/22/2024 7:52:30 AM      ECG 12 lead   Final Result by Marilin Vasquez MD (07/22 0750)   Sinus rhythm with occasional Premature ventricular complexes   Marked ST abnormality, possible anterior subendocardial injury    Abnormal ECG   No previous ECGs available   Confirmed by Marilin Vasquez [id number]) on 7/22/2024 7:54:37 AM          Results from last 7 days   Lab Units 07/21/24  2349   SARS-COV-2  Negative     Results from last 7 days   Lab Units 07/22/24  0351 07/21/24  1906   WBC Thousand/uL 10.94* 10.56*   HEMOGLOBIN g/dL 12.1 12.0   HEMATOCRIT % 36.2* 37.0   PLATELETS Thousands/uL 337 349   TOTAL NEUT ABS Thousands/µL 7.73* 7.87*     Results from last 7 days   Lab Units 07/22/24  0351 07/21/24  1906   SODIUM mmol/L 134* 135   POTASSIUM mmol/L 4.3 3.8   CHLORIDE mmol/L 101 101   CO2 mmol/L 24 19*   ANION GAP mmol/L 9 15*   BUN mg/dL 19 20   CREATININE mg/dL 0.76 0.81   EGFR ml/min/1.73sq m 82 80   CALCIUM mg/dL 8.5 9.0   MAGNESIUM mg/dL 2.2 1.6*     Results from last 7 days   Lab Units 07/21/24  1906   AST U/L 12*   ALT U/L 9   ALK PHOS U/L 74   TOTAL PROTEIN g/dL 6.8   ALBUMIN g/dL 3.9   TOTAL BILIRUBIN mg/dL 0.35     Results from last 7 days   Lab Units 07/22/24  0720 07/21/24  2340   POC GLUCOSE mg/dl 143* 191*     Results from last 7 days   Lab Units 07/22/24  0351 07/21/24  1906   GLUCOSE RANDOM mg/dL 178* 196*       Results from last 7 days   Lab Units 07/21/24  2331 07/21/24  2109 07/21/24  1906   HS TNI 0HR ng/L  --   --  117*   HS TNI 2HR ng/L  --  403*  --    HSTNI D2 ng/L  --  286*  --    HS TNI 4HR ng/L 1,702*  --   --    HSTNI D4 ng/L 1,585*  --   --      Results from last 7 days   Lab Units 07/21/24  1906   D-DIMER QUANTITATIVE ug/ml FEU 0.69*     Results from last 7 days   Lab Units 07/22/24  0352 07/21/24  1906   PROTIME seconds  --  14.0   INR   --  1.06   PTT seconds 174* 24     Results from last 7 days   Lab Units 07/22/24  0351   TSH 3RD GENERATON uIU/mL 1.888     Results from last 7 days   Lab Units 07/21/24  1906   PROCALCITONIN ng/ml <0.05     Results from last 7 days   Lab Units 07/21/24  1906   BNP pg/mL 176*     Results from last 7 days   Lab Units 07/21/24  1906   LIPASE u/L 17     Results from last 7  days   Lab Units 07/21/24  2349   INFLUENZA A PCR  Negative   INFLUENZA B PCR  Negative   RSV PCR  Negative                                               ED Treatment-Medication Administration from 07/21/2024 1857 to 07/21/2024 2231         Date/Time Order Dose Route Action     07/21/2024 1908 sodium chloride 0.9 % bolus 1,000 mL 1,000 mL Intravenous New Bag     07/21/2024 2007 magnesium sulfate 2 g/50 mL IVPB (premix) 2 g 2 g Intravenous New Bag     07/21/2024 2053 iohexol (OMNIPAQUE) 350 MG/ML injection (MULTI-DOSE) 100 mL 100 mL Intravenous Given     07/21/2024 2109 nitroglycerin (NITROSTAT) SL tablet 0.4 mg 0.1 mg Sublingual Given     07/21/2024 2114 furosemide (LASIX) injection 40 mg 40 mg Intravenous Given     07/21/2024 2138 metoprolol (LOPRESSOR) injection 5 mg 5 mg Intravenous Given     07/21/2024 2149 heparin (porcine) injection 5,200 Units 5,200 Units Intravenous Given     07/21/2024 2149 heparin (porcine) 25,000 units in 0.45% NaCl 250 mL infusion (premix) 18 Units/kg/hr Intravenous New Bag            Past Medical History:   Diagnosis Date    Dementia (HCC)     DM (diabetes mellitus) ([facility])     HTN (hypertension)     Hyponatremia        Admitting Diagnosis: Chest pain [R07.9]  NSTEMI (non-ST elevated myocardial infarction) ([facility]) [I21.4]  Age/Sex: 84 y.o. male  Admission Orders:  Telemetry  Pt/ot eval & tx  Consult cardio  Accuchecks  Virtual observation    Scheduled Medications:  Medications 07/13 07/14 07/15 07/16 07/17 07/18 07/19 07/20 07/21 07/22   amLODIPine (NORVASC) tablet 2.5 mg  Dose: 2.5 mg  Freq: Once Route: PO  Start: 07/21/24 2300 End: 07/21/24 2327   Admin Instructions:      Order specific questions:               2327         amLODIPine (NORVASC) tablet 2.5 mg  Dose: 2.5 mg  Freq: Daily Route: PO  Start: 07/22/24 0900 End: 07/21/24 2246   Admin Instructions:      Order specific questions:               2246-D/C'd       amLODIPine (NORVASC) tablet 5 mg  Dose: 5 mg  Freq: Daily Route:  PO  Start: 07/22/24 0900   Admin Instructions:      Order specific questions:                0834        amLODIPine (NORVASC) tablet 5 mg  Dose: 5 mg  Freq: Daily Route: PO  Start: 07/22/24 0900 End: 07/22/24 0047   Admin Instructions:      Order specific questions:                0047-D/C'd      aspirin chewable tablet 81 mg  Dose: 81 mg  Freq: Daily Route: PO  Start: 07/22/24 0900   Admin Instructions:                0834        atorvastatin (LIPITOR) tablet 40 mg  Dose: 40 mg  Freq: Daily with dinner Route: PO  Start: 07/21/24 2300 End: 07/22/24 1020            2327      1020-D/C'd      atorvastatin (LIPITOR) tablet 80 mg  Dose: 80 mg  Freq: Daily with dinner Route: PO  Start: 07/22/24 1630             1630        cefTRIAXone (ROCEPHIN) IVPB (premix in dextrose) 1,000 mg 50 mL  Dose: 1,000 mg  Freq: Every 24 hours Route: IV  Last Dose: 1,000 mg (07/21/24 2325)  Start: 07/21/24 2300 End: 07/22/24 0851   Admin Instructions:      Order specific questions:               2325 0851-D/C'd      citalopram (CeleXA) tablet 20 mg  Dose: 20 mg  Freq: Daily Route: PO  Start: 07/22/24 0900   Admin Instructions:                0834        divalproex sodium (DEPAKOTE) DR tablet 250 mg  Dose: 250 mg  Freq: Daily Route: PO  Start: 07/22/24 0900   Admin Instructions:                0834        famotidine (PEPCID) tablet 20 mg  Dose: 20 mg  Freq: Daily at bedtime Route: PO  Start: 07/21/24 2300            2327      2200        furosemide (LASIX) injection 40 mg  Dose: 40 mg  Freq: Once Route: IV  Start: 07/21/24 2115 End: 07/21/24 2114   Admin Instructions:      Order specific questions:               2114         furosemide (LASIX) injection 40 mg  Dose: 40 mg  Freq: Once Route: IV  Start: 07/21/24 2115 End: 07/21/24 2121   Admin Instructions:      Order specific questions:               (2121)     2121-D/C'd       guaiFENesin (MUCINEX) 12 hr tablet 600 mg  Dose: 600 mg  Freq: Every 12 hours scheduled Route: PO  Start:  07/21/24 2300 End: 07/22/24 0041   Admin Instructions:               2328      0041-D/C'd      heparin (porcine) injection 5,200 Units  Dose: 5,200 Units  Freq: Once Route: IV  Start: 07/21/24 2145 End: 07/21/24 2149   Admin Instructions:               2149         heparin (VTE/PE) high  Freq: Once Route: IV  Start: 07/21/24 2145 End: 07/21/24 2142   Admin Instructions:      Order specific questions:               2142-D/C'd       insulin lispro (HumALOG/ADMELOG) 100 units/mL subcutaneous injection 1-5 Units  Dose: 1-5 Units  Freq: Daily at bedtime Route: SC  Start: 07/21/24 2300   Admin Instructions:                0000 [C]     2200         insulin lispro (HumALOG/ADMELOG) 100 units/mL subcutaneous injection 1-5 Units  Dose: 1-5 Units  Freq: 3 times daily before meals Route: SC  Start: 07/22/24 0700   Admin Instructions:                (0048) (6671)     1600        magnesium sulfate 2 g/50 mL IVPB (premix) 2 g  Dose: 2 g  Freq: Once Route: IV  Last Dose: Stopped (07/21/24 2107)  Start: 07/21/24 1945 End: 07/21/24 2107   Admin Instructions:               2007 2107         memantine (NAMENDA) tablet 10 mg  Dose: 10 mg  Freq: 2 times daily Route: PO  Start: 07/22/24 0900             0834     1800        metoprolol (LOPRESSOR) injection 5 mg  Dose: 5 mg  Freq: Once Route: IV  Start: 07/21/24 2145 End: 07/21/24 2138   Admin Instructions:               2138 [C]         metoprolol tartrate (LOPRESSOR) partial tablet 12.5 mg  Dose: 12.5 mg  Freq: Every 12 hours scheduled Route: PO  Start: 07/21/24 2315 End: 07/23/24 2059   Admin Instructions:      Order specific questions:               6282      0834     2100        metoprolol tartrate (LOPRESSOR) tablet 25 mg  Dose: 25 mg  Freq: Every 12 hours scheduled Route: PO  Start: 07/21/24 2315 End: 07/21/24 2246   Admin Instructions:      Order specific questions:               2246-D/C'd       nitroglycerin (NITROSTAT) SL tablet 0.4 mg  Dose: 0.4 mg  Freq: Once Route:  SL  Start: 07/21/24 2115 End: 07/21/24 2109   Admin Instructions:               2109 [C]         nitroglycerin (NITROSTAT) SL tablet 0.4 mg  Dose: 0.4 mg  Freq: Once Route: SL  Start: 07/21/24 2115 End: 07/21/24 2121   Admin Instructions:               2121) 8551-D/C'd       potassium chloride (Klor-Con M20) CR tablet 20 mEq  Dose: 20 mEq  Freq: Once Route: PO  Start: 07/22/24 0015 End: 07/22/24 0037   Admin Instructions:                0037        QUEtiapine (SEROquel) tablet 12.5 mg  Dose: 12.5 mg  Freq: Once Route: PO  Start: 07/22/24 0015 End: 07/22/24 0037   Admin Instructions:                0037        sodium chloride 0.9 % bolus 1,000 mL  Dose: 1,000 mL  Freq: Once Route: IV  Last Dose: Stopped (07/21/24 2008)  Start: 07/21/24 1915 End: 07/21/24 2008 1908 2008         sodium chloride tablet 1 g  Dose: 1 g  Freq: Daily with dinner Route: PO  Start: 07/22/24 1630             1630                    Continuous Meds Sorted by Name  for Sreekanth Paul as of 07/13/24 through 7/22/24  Legend:       Medications 07/13 07/14 07/15 07/16 07/17 07/18 07/19 07/20 07/21 07/22   heparin (porcine) 25,000 units in 0.45% NaCl 250 mL infusion (premix)  Rate: 2-19.5 mL/hr Dose: 3-30 Units/kg/hr  Weight Dosing Info: 65 kg (Order-Specific)  Freq: Titrated Route: IV  Last Dose: 15 Units/kg/hr (07/22/24 0533)  Start: 07/21/24 2145   Admin Instructions:      Order specific questions:               2149      0433 [C]     0530                    PRN Meds Sorted by Name  for Paul Stack as of 07/13/24 through 7/22/24  Legend:         Medications 07/13 07/14 07/15 07/16 07/17 07/18 07/19 07/20 07/21 07/22   acetaminophen (TYLENOL) tablet 650 mg  Dose: 650 mg  Freq: Every 6 hours PRN Route: PO  PRN Reasons: mild pain,headaches,fever  Indications of Use: FEVER,HEADACHE,MILD PAIN  Start: 07/21/24 2298                dextromethorphan-guaiFENesin (ROBITUSSIN DM) oral syrup 10 mL  Dose: 10 mL  Freq: Every 4 hours PRN  Route: PO  PRN Reasons: cough,congestion  Start: 07/22/24 0031                heparin (porcine) injection 2,600 Units  Dose: 2,600 Units  Freq: Every 6 hours PRN Route: IV  PRN Comment: PTT 43 - 59 seconds  Start: 07/21/24 2142   Admin Instructions:                   heparin (porcine) injection 5,200 Units  Dose: 5,200 Units  Freq: Every 6 hours PRN Route: IV  PRN Comment: PTT less than or equal to 42 seconds  Start: 07/21/24 2142   Admin Instructions:                   iohexol (OMNIPAQUE) 350 MG/ML injection (MULTI-DOSE) 100 mL  Dose: 100 mL  Freq: Once in imaging Route: IV  PRN Reason: contrast  Start: 07/21/24 2052 End: 07/21/24 2053 2053         nitroglycerin (NITROSTAT) SL tablet 0.4 mg  Dose: 0.4 mg  Freq: Every 5 minutes PRN Route: SL  PRN Reason: chest pain  Start: 07/22/24 0002   Admin Instructions:                0032 [C]        ondansetron (ZOFRAN) injection 4 mg  Dose: 4 mg  Freq: Every 6 hours PRN Route: IV  PRN Reasons: nausea,vomiting  Start: 07/21/24 2245   Admin Instructions:                       Network Utilization Review Department  ATTENTION: Please call with any questions or concerns to [phone] and carefully listen to the prompts so that you are directed to the right person. All voicemails are confidential.   For Discharge needs, contact Care Management DC Support Team at [phone] opt. 2  Send all requests for admission clinical reviews, approved or denied determinations and any other requests to dedicated fax number below belonging to the campus where the patient is receiving treatment. List of dedicated fax numbers for the Facilities:  FACILITY NAME UR FAX NUMBER   ADMISSION DENIALS (Administrative/Medical Necessity) [phone]   DISCHARGE SUPPORT TEAM (NETWORK) [phone]   PARENT CHILD HEALTH (Maternity/NICU/Pediatrics) [phone]   [facility] [phone]   [facility] [phone]   [facility]  [facility] [phone]   [facility] [phone]   [facility] [phone]   [facility] [phone]   General acute hospital [phone]   [facility] [phone]   [facility] [phone]   [facility] [phone]   [facility] [phone]   [facility] [phone]

## 2024-07-22 NOTE — H&P
[facility]  H&P  Name: Paul Stack 84 y.o. male I MRN: [id number]  Unit/Bed#: [address] [place] [address] Date of Admission: 7/21/2024   Date of Service: 7/22/2024 I Hospital Day: 1      Assessment & Plan   * Elevated troponin  Assessment & Plan  Patient presents with chest pain.  Per staff at [facility], patient was seen shaking in the dining room after dinner, so staff attended to him and he reported he had chest pain with radiation to back.  911 was called.  EKG showed V3 V4 ST depression, patient was given 4 baby aspirin by EMS.  Per ED note, chest pain resolved after 1 dose of nitro and 324mg of aspirin.   Initial EKG showed sinus rhythm with occasional PVC, ST depression V2 through V6  Initial troponin 117.  Repeat troponin 403-1702  Second EKG showed sinus rhythm with first-degree AV block, ST depression V1 through V6  Third EKG showed NSR, subtle ST depression V3 to V6, flat T wave in inferior leads, QTc 468.  Unknown personal or family history of MI.  Attempted to contact brother over the phone, no answer.  CTA dissection protocol no evidence of aortic dissection.  Suspect NSTEMI  ED provider discussed case with cardiology on-call, recommend heparin drip which was started in ED and keep SBP < 150.  Patient was already given aspirin 324mg p.o. by EMS.  Will continue baby aspirin daily starting tomorrow.  Start low-dose metoprolol and Lipitor 40 mg p.o. daily  Nitrostat as needed for chest pain  Trend troponin  Lipid panel, A1c, TSH free T4  2D echo  Telemetry  N.p.o. after midnight  Consult cardiology    Cough  Assessment & Plan  Noted cough on exam.  No reports of cough per staff in [facility].  Patient became SOB abruptly after CT scan per ED provider, thought was due to flash pulm edema.  Patient was placed on 12L oxygen, was given Lasix 40 IV.  Weaned to oxygen 4L currently.  Satting mid 90s.  Check BNP, COVID flu RSV, procalcitonin  WBC 10.56.  Ordered Rocephin empirically  Robitussin as  "needed  Repeat CBC, procalcitonin in the morning    PAD (peripheral artery disease) ([facility])  Assessment & Plan  CTA showed - Severe vessel disease or occlusion at the V2 segment of the proximal left vertebral artery.Severe stenosis with poststenotic dilatation at the celiac axis. 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  Refer to vascular on discharge  Patient started on aspirin Lipitor as above  Lipid panel in a.m.    Dementia ([facility])  Assessment & Plan  Lives in assisted living facility.  Continue Namenda Celexa Depakote  Supportive care    DM (diabetes mellitus) ([facility])  Assessment & Plan  No results found for: \"HGBA1C\"    Recent Labs     07/21/24  2340   POCGLU 191*       Blood Sugar Average: Last 72 hrs:  (P) 191    On metformin in [place].  Hold while inpatient.  Glucose 191  SSI  Check A1c    HTN (hypertension)  Assessment & Plan  On Norvasc 2.5 mg p.o. daily outpatient.  BP labile in ED.  Will order additional Norvasc 2.5 mg p.o. now and increase Norvasc to 5 mg p.o. daily starting tomorrow  Started on low-dose metoprolol as above  Goal SBP < 150 per cardiology recommendation.  Monitor    Hyponatremia  Assessment & Plan  On Salt tablet 1 g p.o. daily outpatient.  Will continue.  Sodium 135          VTE Prophylaxis: Heparin Drip  / reason for no mechanical VTE prophylaxis heparin drip    Code Status: Full code  POLST: POLST form is not discussed and not completed at this time.    Anticipated Length of Stay:  Patient will be admitted on an Inpatient basis with an anticipated length of stay of  > 2 midnights.   Justification for Hospital Stay: NSTEMI    Total Time for Visit, including Counseling / Coordination of Care: 1 hour.  Greater than 50% of this total time spent on direct patient counseling and coordination of " care.    Chief Complaint:   Chest pain    History of Present Illness:    Paul Stack is a 84 y.o. male with PMH of type 2 diabetes, hypertension, dementia, hyponatremia who presents with chest pain.  Per staff at [facility], patient was seen shaking in the dining room after dinner, so staff attended to him and he reported he had chest pain with radiation to back.  911 was called.  EKG showed V3 V4 ST depression, patient was given 4 baby aspirin by EMS.  Per ED note, chest pain resolved after 1 dose of nitro and 324mg of aspirin.  Patient denies any complaints on exam.  However, poor historian due to dementia.    Per ED provider, patient became SOB abruptly after CT scan, thought was due to flash pulm edema, patient was placed on oxygen 12 L and was given Lasix 40 mg IV.           Review of Systems:    Review of Systems   Unable to perform ROS: Dementia (Spoke to staff at [facility], reviewed ED note)   Respiratory:  Positive for cough and shortness of breath.    Cardiovascular:  Positive for chest pain.   All other systems reviewed and are negative.      Past Medical and Surgical History:     Past Medical History:   Diagnosis Date    Dementia (HCC)     DM (diabetes mellitus) (HCC)     HTN (hypertension)     Hyponatremia        History reviewed. No pertinent surgical history.    Meds/Allergies:    Prior to Admission medications    Medication Sig Start Date End Date Taking? Authorizing Provider   amLODIPine (NORVASC) 2.5 mg tablet Take 2.5 mg by mouth daily   Yes Historical Provider, MD   citalopram (CeleXA) 20 mg tablet Take 20 mg by mouth daily   Yes Historical Provider, MD   dapagliflozin (Farxiga) 5 MG TABS Take 5 mg by mouth daily   Yes Historical Provider, MD   divalproex sodium (DEPAKOTE) 250 mg DR tablet Take 250 mg by mouth daily   Yes Historical Provider, MD   Memantine HCl ER 21 MG CP24 Take 28 mg by mouth daily   Yes Historical Provider, MD   metFORMIN (GLUCOPHAGE) 500 mg tablet Take 500 mg by mouth 2 (two) times a  "day with meals   Yes Historical Provider, MD   sodium chloride 1 g tablet Take 1 g by mouth daily with dinner   Yes Historical Provider, MD   Turmeric 500 MG CAPS Take 500 mg by mouth daily with dinner   Yes Historical Provider, MD   acetaminophen (TYLENOL) 325 mg tablet Take 650 mg by mouth every 4 (four) hours as needed for mild pain or fever    Historical Provider, MD     I have reveiwed home medications using records provided by [facility].    Allergies: No Known Allergies    Social History:     Marital Status: /Civil Union   Occupation: Retired  Patient Pre-hospital Living Situation: [facility]  Patient Pre-hospital Level of Mobility: Unclear  Patient Pre-hospital Diet Restrictions: Diabetic diet  Substance Use History:   Social History     Substance and Sexual Activity   Alcohol Use Never     Social History     Tobacco Use   Smoking Status Unknown   Smokeless Tobacco Not on file     Social History     Substance and Sexual Activity   Drug Use Not on file       Family History:    non-contributory    Physical Exam:     Vitals:   Blood Pressure: (!) 173/76 (07/22/24 0036)  Pulse: 67 (07/22/24 0036)  Temperature: 98 °F (36.7 °C) (07/21/24 2242)  Temp Source: Axillary (07/21/24 2242)  Respirations: 18 (07/22/24 0036)  Height: 5' 6\" (167.6 cm) (07/21/24 2242)  Weight - Scale: 66.6 kg (146 lb 12.8 oz) (07/21/24 2242)  SpO2: 98 % (07/22/24 0036)    Physical Exam  Vitals and nursing note reviewed.   Constitutional:       Appearance: He is well-developed.      Comments: Patient appears comfortable   HENT:      Head: Normocephalic and atraumatic.   Neck:      Thyroid: No thyromegaly.      Vascular: No JVD.      Trachea: No tracheal deviation.   Cardiovascular:      Rate and Rhythm: Normal rate and regular rhythm.      Heart sounds: Normal heart sounds.      Comments: Heart sounds distant  Pulmonary:      Effort: Pulmonary effort is normal. No respiratory distress.      Breath sounds: Normal breath sounds. No wheezing or rales. "   Abdominal:      General: Bowel sounds are normal. There is no distension.      Palpations: Abdomen is soft.      Tenderness: There is no abdominal tenderness. There is no guarding.   Musculoskeletal:      Cervical back: Neck supple.      Right lower leg: No edema.      Left lower leg: No edema.   Skin:     General: Skin is warm and dry.   Neurological:      General: No focal deficit present.      Mental Status: He is alert.      Comments: Patient awake alert oriented to self, follows commands.   Psychiatric:         Mood and Affect: Mood and affect and mood normal.      Comments: Patient tearful during exam, emotional.         Additional Data:     Lab Results: I have personally reviewed pertinent reports.      Results from last 7 days   Lab Units 07/21/24  1906   WBC Thousand/uL 10.56*   HEMOGLOBIN g/dL 12.0   HEMATOCRIT % 37.0   PLATELETS Thousands/uL 349   SEGS PCT % 74   LYMPHO PCT % 13*   MONO PCT % 9   EOS PCT % 2     Results from last 7 days   Lab Units 07/21/24  1906   POTASSIUM mmol/L 3.8   CHLORIDE mmol/L 101   CO2 mmol/L 19*   BUN mg/dL 20   CREATININE mg/dL 0.81   CALCIUM mg/dL 9.0   ALK PHOS U/L 74   ALT U/L 9   AST U/L 12*     Results from last 7 days   Lab Units 07/21/24  1906   INR  1.06       Imaging: I have personally reviewed pertinent reports.      CTA dissection protocol chest/abdomen/pelvis    Result Date: 7/21/2024  Narrative: CTA - CHEST, ABDOMEN AND PELVIS - WITHOUT AND WITH IV CONTRAST INDICATION: chest pain radiating to back. COMPARISON: None. TECHNIQUE: CT examination of the chest, abdomen and pelvis was performed both prior to and after the administration of intravenous contrast. The noncontrast portion of this examination was performed utilizing low radiation dose technique.  Thin section angiographic arterial phase post contrast technique was used in order to evaluate for aortic dissection. 3D reformatted images and volume rendering were performed on an independent workstation.  Multiplanar 2D reformatted images were created from the source data. Radiation dose length product (DLP) for this visit: 974 mGy-cm . This examination, like all CT scans performed in the [facility], was performed utilizing techniques to minimize radiation dose exposure, including the use of iterative reconstruction and automated exposure control. IV Contrast: 100 mL of iohexol (OMNIPAQUE) Enteric Contrast: Not administered. FINDINGS: AORTA: No aortic dissection or intramural hematoma. Infrarenal abdominal aortic aneurysm measuring up to 3.3 cm just proximal to the aortic bifurcation. 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 Severe vessel disease or occlusion at the V2 segment of the proximal left vertebral artery Severe stenosis with poststenotic dilatation at the celiac axis. At least moderate ostial stenosis of the takeoff of the left renal artery. 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 CHEST LUNGS: Lungs are clear. No tracheal or endobronchial lesion. PLEURA: Unremarkable. HEART/PULMONARY ARTERIAL TREE: Heavy atherosclerotic coronary artery calcification. MEDIASTINUM AND MIKAELA: Unremarkable. CHEST WALL AND LOWER NECK: Unremarkable. ABDOMEN LIVER/BILIARY TREE: Unremarkable. GALLBLADDER: No calcified gallstones. No pericholecystic inflammatory change. SPLEEN: Unremarkable. PANCREAS: Unremarkable. ADRENAL GLANDS: Unremarkable. KIDNEYS/URETERS: Simple renal cyst(s). Otherwise unremarkable kidneys. No hydronephrosis. STOMACH AND BOWEL: Colonic diverticulosis without findings of acute diverticulitis. APPENDIX: No findings to suggest appendicitis. ABDOMINOPELVIC CAVITY: No ascites. No pneumoperitoneum. No lymphadenopathy. PELVIS REPRODUCTIVE ORGANS: Unremarkable for patient's age. URINARY BLADDER: Unremarkable. ABDOMINAL WALL/INGUINAL REGIONS:  Unremarkable. BONES: No acute fracture or suspicious osseous lesion.     Impression: No evidence of aortic dissection. Infrarenal abdominal aortic aneurysm measuring up to 3.3 cm just proximal to the aortic bifurcation Severe vessel disease as detailed above involving the left vertebral artery, celiac axis and left common femoral artery. Workstation performed: [id number]       EKG, Pathology, and Other Studies Reviewed on Admission:   EKG: As above    All[employer] Records Reviewed: Yes     ** Please Note: Dragon 360 Dictation voice to text software may have been used in the creation of this document. **

## 2024-07-23 ENCOUNTER — APPOINTMENT (INPATIENT)
Dept: RADIOLOGY | Facility: HOSPITAL | Age: 84
DRG: 280 | End: 2024-07-23
Payer: COMMERCIAL

## 2024-07-23 ENCOUNTER — APPOINTMENT (INPATIENT)
Dept: NON INVASIVE DIAGNOSTICS | Facility: HOSPITAL | Age: 84
DRG: 280 | End: 2024-07-23
Payer: COMMERCIAL

## 2024-07-23 PROBLEM — R05.9 COUGH: Status: RESOLVED | Noted: 2024-07-22 | Resolved: 2024-07-23

## 2024-07-23 LAB
ANION GAP SERPL CALCULATED.3IONS-SCNC: 8 MMOL/L (ref 4–13)
APTT PPP: 61 SECONDS (ref 23–37)
APTT PPP: 63 SECONDS (ref 23–37)
BASOPHILS # BLD AUTO: 0.09 THOUSANDS/ÂΜL (ref 0–0.1)
BASOPHILS NFR BLD AUTO: 1 % (ref 0–1)
BUN SERPL-MCNC: 21 MG/DL (ref 5–25)
CALCIUM SERPL-MCNC: 8.5 MG/DL (ref 8.4–10.2)
CHEST PAIN STATEMENT: NORMAL
CHLORIDE SERPL-SCNC: 102 MMOL/L (ref 96–108)
CO2 SERPL-SCNC: 22 MMOL/L (ref 21–32)
CREAT SERPL-MCNC: 0.68 MG/DL (ref 0.6–1.3)
EOSINOPHIL # BLD AUTO: 0.22 THOUSAND/ÂΜL (ref 0–0.61)
EOSINOPHIL NFR BLD AUTO: 2 % (ref 0–6)
ERYTHROCYTE [DISTWIDTH] IN BLOOD BY AUTOMATED COUNT: 13.8 % (ref 11.6–15.1)
GFR SERPL CREATININE-BSD FRML MDRD: 86 ML/MIN/1.73SQ M
GLUCOSE SERPL-MCNC: 110 MG/DL (ref 65–140)
GLUCOSE SERPL-MCNC: 119 MG/DL (ref 65–140)
GLUCOSE SERPL-MCNC: 136 MG/DL (ref 65–140)
GLUCOSE SERPL-MCNC: 137 MG/DL (ref 65–140)
GLUCOSE SERPL-MCNC: 175 MG/DL (ref 65–140)
HCT VFR BLD AUTO: 34.7 % (ref 36.5–49.3)
HGB BLD-MCNC: 11.4 G/DL (ref 12–17)
IMM GRANULOCYTES # BLD AUTO: 0.04 THOUSAND/UL (ref 0–0.2)
IMM GRANULOCYTES NFR BLD AUTO: 0 % (ref 0–2)
LYMPHOCYTES # BLD AUTO: 1.99 THOUSANDS/ÂΜL (ref 0.6–4.47)
LYMPHOCYTES NFR BLD AUTO: 20 % (ref 14–44)
MAGNESIUM SERPL-MCNC: 2 MG/DL (ref 1.9–2.7)
MAX DIASTOLIC BP: 68 MMHG
MAX HR PERCENT: 60 %
MAX HR: 81 BPM
MAX PREDICTED HEART RATE: 134 BPM
MCH RBC QN AUTO: 31 PG (ref 26.8–34.3)
MCHC RBC AUTO-ENTMCNC: 32.9 G/DL (ref 31.4–37.4)
MCV RBC AUTO: 94 FL (ref 82–98)
MONOCYTES # BLD AUTO: 0.88 THOUSAND/ÂΜL (ref 0.17–1.22)
MONOCYTES NFR BLD AUTO: 9 % (ref 4–12)
MRSA NOSE QL CULT: NORMAL
NEUTROPHILS # BLD AUTO: 6.7 THOUSANDS/ÂΜL (ref 1.85–7.62)
NEUTS SEG NFR BLD AUTO: 68 % (ref 43–75)
NRBC BLD AUTO-RTO: 0 /100 WBCS
NUC STRESS EJECTION FRACTION: 36 %
PLATELET # BLD AUTO: 320 THOUSANDS/UL (ref 149–390)
PMV BLD AUTO: 9.6 FL (ref 8.9–12.7)
POTASSIUM SERPL-SCNC: 4.1 MMOL/L (ref 3.5–5.3)
PROTOCOL NAME: NORMAL
RATE PRESSURE PRODUCT: NORMAL
RBC # BLD AUTO: 3.68 MILLION/UL (ref 3.88–5.62)
REASON FOR TERMINATION: NORMAL
SL CV REST NUCLEAR ISOTOPE DOSE: 10.72 MCI
SL CV STRESS NUCLEAR ISOTOPE DOSE: 31.7 MCI
SL CV STRESS RECOVERY BP: NORMAL MMHG
SL CV STRESS RECOVERY HR: 73 BPM
SL CV STRESS RECOVERY O2 SAT: 100 %
SODIUM SERPL-SCNC: 132 MMOL/L (ref 135–147)
STRESS ANGINA INDEX: 0
STRESS BASELINE BP: NORMAL MMHG
STRESS BASELINE HR: 64 BPM
STRESS O2 SAT REST: 98 %
STRESS PEAK HR: 81 BPM
STRESS POST ESTIMATED WORKLOAD: 1 METS
STRESS POST EXERCISE DUR MIN: 1 MIN
STRESS POST EXERCISE DUR MIN: 1 MIN
STRESS POST EXERCISE DUR SEC: 0 SEC
STRESS POST O2 SAT PEAK: 98 %
STRESS POST PEAK BP: 147 MMHG
STRESS POST PEAK HR: 81 BPM
STRESS POST PEAK SYSTOLIC BP: 152 MMHG
STRESS/REST PERFUSION RATIO: 1.12
TARGET HR FORMULA: NORMAL
TEST INDICATION: NORMAL
WBC # BLD AUTO: 9.92 THOUSAND/UL (ref 4.31–10.16)

## 2024-07-23 PROCEDURE — 82948 REAGENT STRIP/BLOOD GLUCOSE: CPT

## 2024-07-23 PROCEDURE — 93017 CV STRESS TEST TRACING ONLY: CPT

## 2024-07-23 PROCEDURE — 85730 THROMBOPLASTIN TIME PARTIAL: CPT | Performed by: STUDENT IN AN ORGANIZED HEALTH CARE EDUCATION/TRAINING PROGRAM

## 2024-07-23 PROCEDURE — 93018 CV STRESS TEST I&R ONLY: CPT | Performed by: INTERNAL MEDICINE

## 2024-07-23 PROCEDURE — 99232 SBSQ HOSP IP/OBS MODERATE 35: CPT | Performed by: INTERNAL MEDICINE

## 2024-07-23 PROCEDURE — 99233 SBSQ HOSP IP/OBS HIGH 50: CPT | Performed by: INTERNAL MEDICINE

## 2024-07-23 PROCEDURE — 78452 HT MUSCLE IMAGE SPECT MULT: CPT | Performed by: INTERNAL MEDICINE

## 2024-07-23 PROCEDURE — A9502 TC99M TETROFOSMIN: HCPCS

## 2024-07-23 PROCEDURE — 78452 HT MUSCLE IMAGE SPECT MULT: CPT

## 2024-07-23 PROCEDURE — 97530 THERAPEUTIC ACTIVITIES: CPT

## 2024-07-23 PROCEDURE — 80048 BASIC METABOLIC PNL TOTAL CA: CPT | Performed by: STUDENT IN AN ORGANIZED HEALTH CARE EDUCATION/TRAINING PROGRAM

## 2024-07-23 PROCEDURE — 85025 COMPLETE CBC W/AUTO DIFF WBC: CPT | Performed by: STUDENT IN AN ORGANIZED HEALTH CARE EDUCATION/TRAINING PROGRAM

## 2024-07-23 PROCEDURE — 93016 CV STRESS TEST SUPVJ ONLY: CPT | Performed by: INTERNAL MEDICINE

## 2024-07-23 PROCEDURE — 83735 ASSAY OF MAGNESIUM: CPT | Performed by: STUDENT IN AN ORGANIZED HEALTH CARE EDUCATION/TRAINING PROGRAM

## 2024-07-23 RX ORDER — REGADENOSON 0.08 MG/ML
0.4 INJECTION, SOLUTION INTRAVENOUS ONCE
Status: COMPLETED | OUTPATIENT
Start: 2024-07-23 | End: 2024-07-23

## 2024-07-23 RX ORDER — EZETIMIBE 10 MG/1
10 TABLET ORAL
Status: DISCONTINUED | OUTPATIENT
Start: 2024-07-23 | End: 2024-07-25 | Stop reason: HOSPADM

## 2024-07-23 RX ORDER — CLOPIDOGREL BISULFATE 75 MG/1
75 TABLET ORAL DAILY
Status: DISCONTINUED | OUTPATIENT
Start: 2024-07-23 | End: 2024-07-25 | Stop reason: HOSPADM

## 2024-07-23 RX ORDER — LOSARTAN POTASSIUM 25 MG/1
25 TABLET ORAL DAILY
Status: DISCONTINUED | OUTPATIENT
Start: 2024-07-23 | End: 2024-07-23

## 2024-07-23 RX ORDER — ISOSORBIDE DINITRATE 10 MG/1
10 TABLET ORAL
Status: DISCONTINUED | OUTPATIENT
Start: 2024-07-24 | End: 2024-07-25 | Stop reason: HOSPADM

## 2024-07-23 RX ADMIN — ASPIRIN 81 MG CHEWABLE TABLET 81 MG: 81 TABLET CHEWABLE at 08:52

## 2024-07-23 RX ADMIN — CLOPIDOGREL 75 MG: 75 TABLET ORAL at 08:52

## 2024-07-23 RX ADMIN — SODIUM CHLORIDE 1 G: 1 TABLET ORAL at 17:35

## 2024-07-23 RX ADMIN — CITALOPRAM HYDROBROMIDE 20 MG: 20 TABLET ORAL at 08:52

## 2024-07-23 RX ADMIN — DIVALPROEX SODIUM 250 MG: 250 TABLET, DELAYED RELEASE ORAL at 08:52

## 2024-07-23 RX ADMIN — LOSARTAN POTASSIUM 25 MG: 25 TABLET, FILM COATED ORAL at 08:52

## 2024-07-23 RX ADMIN — EZETIMIBE 10 MG: 10 TABLET ORAL at 22:14

## 2024-07-23 RX ADMIN — INSULIN LISPRO 1 UNITS: 100 INJECTION, SOLUTION INTRAVENOUS; SUBCUTANEOUS at 17:35

## 2024-07-23 RX ADMIN — MEMANTINE 10 MG: 10 TABLET ORAL at 08:52

## 2024-07-23 RX ADMIN — MEMANTINE 10 MG: 10 TABLET ORAL at 17:35

## 2024-07-23 RX ADMIN — AMLODIPINE BESYLATE 5 MG: 5 TABLET ORAL at 08:52

## 2024-07-23 RX ADMIN — REGADENOSON 0.4 MG: 0.08 INJECTION, SOLUTION INTRAVENOUS at 10:45

## 2024-07-23 RX ADMIN — FAMOTIDINE 20 MG: 20 TABLET, FILM COATED ORAL at 22:14

## 2024-07-23 RX ADMIN — Medication 12.5 MG: at 08:52

## 2024-07-23 RX ADMIN — ATORVASTATIN CALCIUM 80 MG: 80 TABLET, FILM COATED ORAL at 17:35

## 2024-07-23 NOTE — ASSESSMENT & PLAN NOTE
Lab Results   Component Value Date    HGBA1C 7.0 (H) 07/22/2024       Recent Labs     07/22/24  1608 07/22/24  2019 07/23/24  0731 07/23/24  1203   POCGLU 161* 149* 110 137         Blood Sugar Average: Last 72 hrs:  (P) 146.3971057090220095    Resume SSI regimen

## 2024-07-23 NOTE — PROGRESS NOTES
[facility]  Progress Note  Name: Paul Stack I  MRN: [id number]  Unit/Bed#: [address] [address] I Date of Admission: 7/21/2024   Date of Service: 7/23/2024 I Hospital Day: 2    Assessment & Plan   * NSTEMI (non-ST elevated myocardial infarction) ([facility])  Assessment & Plan  Patient presents with chest pain.  Per staff at [facility], patient was seen shaking in the dining room after dinner, so staff attended to him and he reported he had chest pain with radiation to back.  911 was called.  EKG showed V3 V4 ST depression, patient was given 4 baby aspirin by EMS.  Per ED note, chest pain resolved after 1 dose of nitro and 324mg of aspirin.   Initial EKG showed sinus rhythm with occasional PVC, ST depression V2 through V6  Initial troponin 117.  Repeat troponin 403-1702  Continue heparin drip  Recommendations on possible catheterization due to abnormal stress test  Continue double product control, heart rate and blood pressure  Echocardiogram with wall motion abnormality as listed below  Left Ventricle: Left ventricular cavity size is normal. Wall thickness is mildly increased. The left ventricular ejection fraction is 45% by visual estimation. Systolic function is mildly reduced. Severe hypokinesis of basal-mid inferior and inferolaterla walls Diastolic function is abnormal.     Acute hypoxemic respiratory failure (HCC)  Assessment & Plan  Initially required 4 L of oxygen, now on room air  CT Chest did not reveal any acute pulmonary process.    NSTEMI/Cardiac workup as above.     Abnormal CT scan  Assessment & Plan  CT notes infrarenal abdominal aortic aneurysm measuring up to 3.3 cm just proximal to the aortic bifurcation.    Will require follow up imaging     PAD (peripheral artery disease) ([facility])  Assessment & Plan  CTA showed - Severe vessel disease or occlusion at the V2 segment of the proximal left vertebral artery.Severe stenosis with poststenotic dilatation at the celiac axis. At least moderate  ostial stenosis of the takeoff of the left renal artery.Complete occlusion of the left common femoral artery with reconstitution at the takeoff of the superficial femoral artery which demonstrates heavy atherosclerotic disease.  Refer to Vascular Surgery at discharge   Resume Asprin and Statin     Dementia ([facility])  Assessment & Plan  Continue Namenda, Celexa and Depakote  Was agitated overnight and was given Seroquel along with virtual observation.      DM (diabetes mellitus) ([facility])  Assessment & Plan  Lab Results   Component Value Date    HGBA1C 7.0 (H) 07/22/2024       Recent Labs     07/22/24  1608 07/22/24  2019 07/23/24  0731 07/23/24  1203   POCGLU 161* 149* 110 137         Blood Sugar Average: Last 72 hrs:  (P) 146.1340209135710075    Resume SSI regimen     HTN (hypertension)  Assessment & Plan  Resume Norvasc and Metoprolol     Hyponatremia  Assessment & Plan  On Salt tablet 1 g p.o. daily outpatient  Recent Labs     07/21/24  1906 07/22/24  0351 07/23/24  0520   SODIUM 135 134* 132*         Cough-resolved as of 7/23/2024  Assessment & Plan  Pt was started on Ceftriaxone empirically during admission.   CT reveals no evidence of pneumonia.    Procal level is WNL.    Pt has no leukocytosis and is afebrile.    Will discontinue Ceftriaxone and follow clinically                  Hospital Course:     84-year-old male patient presenting with chest pain, found of elevated cardiac enzyme.  Received 48 hours of heparin drip.  Stress test performed today with possible reversible ischemia.  He is pending further evaluation by the cardiology service    Assessment:      Principal Problem:    NSTEMI (non-ST elevated myocardial infarction) ([facility])  Active Problems:    Hyponatremia    HTN (hypertension)    DM (diabetes mellitus) ([facility])    Dementia ([facility])    PAD (peripheral artery disease) ([facility])    Abnormal CT scan    Acute hypoxemic respiratory failure ([facility])      Plan:    Continue heparin drip  Check labs in a.m.  Fluid  restriction       VTE Pharmacologic Prophylaxis:   Pharmacologic: Heparin Drip  Mechanical VTE Prophylaxis in Place: Yes    AM-PAC Basic Mobility:  Basic Mobility Inpatient Raw Score: 17    JH-HLM Achieved: 7: Walk 25 feet or more  JH-HLM Goal: 5: Stand one or more mins    HLM Goal listed above. Continue with multidisciplinary rounding and encourage appropriate mobility to improve upon HLM goals.         Patient Centered Rounds: Case discussed and reviewed with nursing    Discussions with Specialists or Other Care Team Provider: Case management    Education and Discussions with Family / Patient: Discussed with patient's brother     Time Spent for Care: 80 minutes.  More than 50% of total time spent on counseling and coordination of care as described above.    Current Length of Stay: 2 day(s)    Current Patient Status: Inpatient   Certification Statement: The patient will continue to require additional inpatient hospital stay due to possible need for cardiac catheterization versus medication titration    Discharge Plan / Estimated Discharge Date: Greater than 72 hours    Code Status: Level 1 - Full Code      Subjective:   And examined, no acute complaints.  No nausea no vomiting.  Slightly confused per patient's brother.  Does report some intermittent chest pain but controlled at the time my evaluation    A complete and comprehensive 14 point organ system review has been performed and all other systems are negative other than stated above.    Objective:     Vitals:   Temp (24hrs), Av.9 °F (36.6 °C), Min:97.7 °F (36.5 °C), Max:98 °F (36.7 °C)    Temp:  [97.7 °F (36.5 °C)-98 °F (36.7 °C)] 98 °F (36.7 °C)  HR:  [64-75] 75  Resp:  [17-20] 18  BP: (132-170)/(53-72) 132/53  SpO2:  [93 %-99 %] 96 %  Body mass index is 23.7 kg/m².     Input and Output Summary (last 24 hours):       Intake/Output Summary (Last 24 hours) at 2024 1643  Last data filed at 2024 1845  Gross per 24 hour   Intake 200 ml   Output 1200  ml   Net -1000 ml       Physical Exam:     General: ill  appearing, no acute distress  HEENT: atraumatic, PERRLA, moist mucosa, normal pharynx, normal tonsils and adenoids, normal tongue, no fluid in sinuses  Neck: Trachea midline, no carotid bruit, no masses  Respiratory: normal chest wall expansion, CTA B, no r/r/w, no rubs  Cardiovascular: RRR, no m/r/g, Normal S1 and S2  Abdomen: Soft, non-tender, non-distended, normal bowel sounds in all quadrants, no hepatosplenomegaly, no tympany  Rectal: deferred  Musculoskeletal: normal ROM in upper and lower extremities  Integumentary: warm, dry, and pink, with no rash, purpura, or petechia  Heme/Lymph: no lymphadenopathy, no bruises  Neurological: Cranial Nerves II-XII grossly intact  Psychiatric: cooperative with normal mood, affect, and cognition      Additional Data:     Labs:    Results from last 7 days   Lab Units 07/23/24  0520   WBC Thousand/uL 9.92   HEMOGLOBIN g/dL 11.4*   HEMATOCRIT % 34.7*   PLATELETS Thousands/uL 320   SEGS PCT % 68   LYMPHO PCT % 20   MONO PCT % 9   EOS PCT % 2     Results from last 7 days   Lab Units 07/23/24  0520 07/22/24  0351 07/21/24  1906   POTASSIUM mmol/L 4.1   < > 3.8   CHLORIDE mmol/L 102   < > 101   CO2 mmol/L 22   < > 19*   BUN mg/dL 21   < > 20   CREATININE mg/dL 0.68   < > 0.81   CALCIUM mg/dL 8.5   < > 9.0   ALK PHOS U/L  --   --  74   ALT U/L  --   --  9   AST U/L  --   --  12*    < > = values in this interval not displayed.     Results from last 7 days   Lab Units 07/21/24  1906   INR  1.06       * I Have Reviewed All Lab Data Listed Above.  * Additional Pertinent Lab Tests Reviewed: All Labs For Current Hospital Admission Reviewed    Imaging:    Imaging Reports Reviewed Today Include: No new imaging  Imaging Personally Reviewed by Myself Includes: no New imaging    Recent Cultures (last 7 days):           Last 24 Hours Medication List:   Current Facility-Administered Medications   Medication Dose Route Frequency Provider  Last Rate    acetaminophen  650 mg Oral Q6H PRN Cuiyin Yurik, CRNP      amLODIPine  5 mg Oral Daily Cuibell Wellingtonk, CRNP      aspirin  81 mg Oral Daily Cuibell Slaterrik, CRNP      atorvastatin  80 mg Oral Daily With Dinner Radha Giles, CRNP      citalopram  20 mg Oral Daily Cuiyibuddy Slaterrik, CRNP      clopidogrel  75 mg Oral Daily Radhapaige Giles, CRNP      dextromethorphan-guaiFENesin  10 mL Oral Q4H PRN Cuiyin Yurik, CRNP      divalproex sodium  250 mg Oral Daily Cuiyin Yurik, CRNP      ezetimibe  10 mg Oral HS Radha Paiam, CRNP      famotidine  20 mg Oral HS Cuirafiabuddy Slaterrik, CRNP      heparin (porcine)  3-30 Units/kg/hr (Order-Specific) Intravenous Titrated Radha Giles, MANDEEPNP 13 Units/kg/hr (07/22/24 2137)    heparin (porcine)  2,600 Units Intravenous Q6H PRN Cuiyin Yurik, CRNP      heparin (porcine)  5,200 Units Intravenous Q6H PRN Cuiyin Yurik, CRNP      insulin lispro  1-5 Units Subcutaneous TID AC Cuiyin Yurik, CRNP      insulin lispro  1-5 Units Subcutaneous HS Cuiyin Yurik, CRNP      losartan  25 mg Oral Daily Radha Giles, CRNP      memantine  10 mg Oral BID Cuiyibuddy Slaterrik, CRNP      nitroglycerin  0.4 mg Sublingual Q5 Min PRN Cuiyin Yurik, CRNP      ondansetron  4 mg Intravenous Q6H PRN Cuiyin Yurik, CRNP      sodium chloride  1 g Oral Daily With Dinner Cuirafian Nohemyrik, CRNP         AM-PAC Basic Mobility:  Basic Mobility Inpatient Raw Score: 17    JH-HLM Achieved: 7: Walk 25 feet or more  JH-HLM Goal: 5: Stand one or more mins    HLM Goal listed above. Continue with multidisciplinary rounding and encourage appropriate mobility to improve upon HLM goals.     Today, Patient Was Seen By: Tyshawn Pa DO    ** Please Note: This note was completed in part utilizing [employer] Dragon One Medical software dictation.  Grammatical errors, random word insertions, spelling mistakes, and incomplete sentences may be an occasional consequence of this system secondary to software limitations, ambient noise, and hardware issues.  If  you have any questions or concerns about the content, text, or information contained within the body of this dictation, please contact the provider for clarification. **

## 2024-07-23 NOTE — NURSING NOTE
Patient failed VRC due to unable to redirect, patient placed on q15 safety checks. Improvement in behavior observed.

## 2024-07-23 NOTE — PROGRESS NOTES
Patient:    MRN:  [id number]    Deon Request ID:  [id number]    Level of care reserved:  Skilled Nursing Facility    Partner Reserved:  [place] [address] [phone]    Clinical needs requested:    Geography searched:  10 miles around [id number]    Start of Service:    Request sent:  1:24pm EDT on 7/23/2024 by Maritza Mccall    Partner reserved:  2:03pm EDT on 7/23/2024 by Maritza Mccall    Choice list shared:  2:03pm EDT on 7/23/2024 by Maritza Mccall

## 2024-07-23 NOTE — CASE MANAGEMENT
Case Management Discharge Planning Note    Patient name Paul Stack  Location [address] [facility] [facility] [address] [address]-* MRN [id number]  : 1940 Date 2024       Current Admission Date: 2024  Current Admission Diagnosis:NSTEMI (non-ST elevated myocardial infarction) (HCC)   Patient Active Problem List    Diagnosis Date Noted Date Diagnosed    Cough 2024     PAD (peripheral artery disease) (HCC) 2024     Abnormal CT scan 2024     Acute hypoxemic respiratory failure (HCC) 2024     NSTEMI (non-ST elevated myocardial infarction) (HCC) 2024     Hyponatremia      HTN (hypertension)      DM (diabetes mellitus) (HCC)      Dementia (HCC)        LOS (days): 2  Geometric Mean LOS (GMLOS) (days):   Days to GMLOS:     OBJECTIVE:  Risk of Unplanned Readmission Score: 14.04         Current admission status: Inpatient   Preferred Pharmacy:   [facility] [address] [address]  Phone: [phone] Fax: [phone]    Primary Care Provider: No primary care provider on file.    [facility]  Secondary Insurance: [employer]    DISCHARGE DETAILS:  Additional Comments: DORETHA spoke with Micha Correia who requested CM sent referral in BLAINEIN shahla Pacheco in admissions to review. CM informed PT/OT rec of [facility]. Micha [facility] to review and see if he needs to go to their in house STR the [facility].

## 2024-07-23 NOTE — ASSESSMENT & PLAN NOTE
On Salt tablet 1 g p.o. daily outpatient  Recent Labs     07/21/24  1906 07/22/24  0351 07/23/24  0520   SODIUM 135 134* 132*

## 2024-07-23 NOTE — ASSESSMENT & PLAN NOTE
Continue Namenda, Celexa and Depakote  Was agitated overnight and was given Seroquel along with virtual observation.

## 2024-07-23 NOTE — ASSESSMENT & PLAN NOTE
Initially required 4 L of oxygen, now on room air  CT Chest did not reveal any acute pulmonary process.    NSTEMI/Cardiac workup as above.

## 2024-07-23 NOTE — PLAN OF CARE
Problem: PAIN - ADULT  Goal: Verbalizes/displays adequate comfort level or baseline comfort level  Description: Interventions:  - Encourage patient to monitor pain and request assistance  - Assess pain using appropriate pain scale  - Administer analgesics based on type and severity of pain and evaluate response  - Implement non-pharmacological measures as appropriate and evaluate response  - Consider cultural and social influences on pain and pain management  - Notify physician/advanced practitioner if interventions unsuccessful or patient reports new pain  Outcome: Progressing     Problem: INFECTION - ADULT  Goal: Absence or prevention of progression during hospitalization  Description: INTERVENTIONS:  - Assess and monitor for signs and symptoms of infection  - Monitor lab/diagnostic results  - Monitor all insertion sites, i.e. indwelling lines, tubes, and drains  - Monitor endotracheal if appropriate and nasal secretions for changes in amount and color  - [place] appropriate cooling/warming therapies per order  - Administer medications as ordered  - Instruct and encourage patient and family to use good hand hygiene technique  - Identify and instruct in appropriate isolation precautions for identified infection/condition  Outcome: Progressing     Problem: SAFETY ADULT  Goal: Patient will remain free of falls  Description: INTERVENTIONS:  - Educate patient/family on patient safety including physical limitations  - Instruct patient to call for assistance with activity   - Consult OT/PT to assist with strengthening/mobility   - Keep Call bell within reach  - Keep bed low and locked with side rails adjusted as appropriate  - Keep care items and personal belongings within reach  - Initiate and maintain comfort rounds  - Make Fall Risk Sign visible to staff  - Offer Toileting every 2 Hours, in advance of need  - Initiate/Maintain bed alarm  - Obtain necessary fall risk management equipment: fall risk sign on door  -  Apply yellow socks and bracelet for high fall risk patients  - Consider moving patient to room near nurses station  Outcome: Progressing  Goal: Maintain or return to baseline ADL function  Description: INTERVENTIONS:  -  Assess patient's ability to carry out ADLs; assess patient's baseline for ADL function and identify physical deficits which impact ability to perform ADLs (bathing, care of mouth/teeth, toileting, grooming, dressing, etc.)  - Assess/evaluate cause of self-care deficits   - Assess range of motion  - Assess patient's mobility; develop plan if impaired  - Assess patient's need for assistive devices and provide as appropriate  - Encourage maximum independence but intervene and supervise when necessary  - Involve family in performance of ADLs  - Assess for home care needs following discharge   - Consider OT consult to assist with ADL evaluation and planning for discharge  - Provide patient education as appropriate  Outcome: Progressing  Goal: Maintains/Returns to pre admission functional level  Description: INTERVENTIONS:  - Perform AM-PAC 6 Click Basic Mobility/ Daily Activity assessment daily.  - Set and communicate daily mobility goal to care team and patient/family/caregiver.   - Collaborate with rehabilitation services on mobility goals if consulted  - Perform Range of Motion 2 times a day.  - Reposition patient every 2 hours.  - Dangle patient 2 times a day  - Stand patient 2 times a day  - Ambulate patient 3 times a day  - Out of bed to chair 3 times a day   - Out of bed for meals 3 times a day  - Out of bed for toileting  - Record patient progress and toleration of activity level   Outcome: Progressing     Problem: DISCHARGE PLANNING  Goal: Discharge to home or other facility with appropriate resources  Description: INTERVENTIONS:  - Identify barriers to discharge w/patient and caregiver  - Arrange for needed discharge resources and transportation as appropriate  - Identify discharge learning  needs (meds, wound care, etc.)  - Arrange for interpretive services to assist at discharge as needed  - Refer to Case Management Department for coordinating discharge planning if the patient needs post-hospital services based on physician/advanced practitioner order or complex needs related to functional status, cognitive ability, or social support system  Outcome: Progressing     Problem: Knowledge Deficit  Goal: Patient/family/caregiver demonstrates understanding of disease process, treatment plan, medications, and discharge instructions  Description: Complete learning assessment and assess knowledge base.  Interventions:  - Provide teaching at level of understanding  - Provide teaching via preferred learning methods  Outcome: Progressing     Problem: Prexisting or High Potential for Compromised Skin Integrity  Goal: Skin integrity is maintained or improved  Description: INTERVENTIONS:  - Identify patients at risk for skin breakdown  - Assess and monitor skin integrity  - Assess and monitor nutrition and hydration status  - Monitor labs   - Assess for incontinence   - Turn and reposition patient  - Assist with mobility/ambulation  - Relieve pressure over bony prominences  - Avoid friction and shearing  - Provide appropriate hygiene as needed including keeping skin clean and dry  - Evaluate need for skin moisturizer/barrier cream  - Collaborate with interdisciplinary team   - Patient/family teaching  - Consider wound care consult   Outcome: Progressing

## 2024-07-23 NOTE — PHYSICAL THERAPY NOTE
"   PT TREATMENT     07/23/24 1510   PT Last Visit   PT Visit Date 07/23/24   Note Type   Note Type Treatment   Pain Assessment   Pain Assessment Tool 0-10   Pain Score No Pain   Restrictions/Precautions   Other Precautions Fall Risk;Bed Alarm;Chair Alarm;Cognitive   General   Chart Reviewed Yes   Additional Pertinent History Pt cleared for PT by Maritza LLOYD   Cognition   Overall Cognitive Status Impaired   Arousal/Participation Cooperative   Attention Attends with cues to redirect   Orientation Level Oriented to person   Following Commands Follows one step commands inconsistently   Subjective   Subjective Pt agreeable to activity.  Pt has difficulty expressing himself/speaks with \"word salad\" and difficulty word finding.   Transfers   Sit to Stand 5  Supervision   Stand to Sit 5  Supervision   Stand pivot 4  Minimal assistance   Additional Comments Pt stood x 5 minutes with supervision looking out the window, reaching for items on window sill.   Ambulation/Elevation   Gait Assistance 4  Minimal assist   Assistive Device Rolling walker   Distance 120 feet, 50 feet   Balance   Static Sitting Fair +   Static Standing Fair   Ambulatory Fair   Assessment   Prognosis Good   Problem List Decreased strength;Decreased endurance;Impaired balance;Decreased mobility;Decreased cognition   Assessment Pt demonstrates improving function as pt was able to ambulate 120 feet in the hallway today with a walker and min assist.    The patient's AM-PAC Basic Mobility Inpatient Short Form Raw Score is 16. A Raw score of less than or equal to 16 suggests the patient may benefit from discharge to post-acute rehabilitation services. Please also refer to the recommendation of the Physical Therapist for safe discharge planning.         Plan   Treatment/Interventions LE strengthening/ROM;Functional transfer training;ADL retraining;Therapeutic exercise;Endurance training;Cognitive reorientation;Gait training;Bed mobility;Equipment eval/education "   Progress Progressing toward goals   PT Frequency Other (Comment)  (5x/week)   Discharge Recommendation   Rehab Resource Intensity Level, PT III (Minimum Resource Intensity)   AM-PAC Basic Mobility Inpatient   Turning in Flat Bed Without Bedrails 3   Lying on Back to Sitting on Edge of Flat Bed Without Bedrails 3   Moving Bed to Chair 3   Standing Up From Chair Using Arms 3   Walk in Room 3   Climb 3-5 Stairs With Railing 2   Basic Mobility Inpatient Raw Score 17   Basic Mobility Standardized Score 39.67   [facility] Highest Level Of Mobility   -HL Goal 5: Stand one or more mins   -HLM Achieved 7: Walk 25 feet or more   End of Consult   Patient Position at End of Consult All needs within reach;Bed/Chair alarm activated;Bedside chair   Licensure   [place] License Number  Loretta Pereira PT [id number]

## 2024-07-23 NOTE — PROGRESS NOTES
Progress Note - Cardiology   [facility]     Paul Stack 84 y.o. male MRN: [id number]  : 1940  Unit/Bed#: [address] [place] [address] Encounter: [id number]    Assessment and Plan:   1. NSTEMI: patient presented with 6/10 chest discomfort which radiated to his back    -   hs trop: 117 (0hr), 403 (2hr), 1702 (4hr)    -   random hs trop: 4485 and 4457    -   admission EKG demonstrated ST depression of more than 3 mm in anterior lateral leads    -   patient started on IV heparin ACS protocol in the emergency room, will complete 2024 10 PM    -   not started on beta blocker due to bradycardia    -   2024 TTE: EF visibly estimated 45% with severe hypo kinesis of basal mid inferior and inferior lateral walls, there is mild to moderate aortic regurgitation and left atrium is mildly dilated    -   patient started on Plavix 75 mg once a day    -   patient on aspirin 81 mg once a day    -   patient statin was escalated to high intensity with Lipitor 80 mg once a day      -   on Pepcid for G.I. prophylaxis    -   for Lexiscan nuclear stress test today to evaluate for ischemic burden    2. Hypertension: blood pressures elevated    -   continue Norvasc 5 mg daily    -   will add Cozaar 25 mg once a day    -   monitor blood pressures and titrate as needed    3. Diabetes: A1c 7 with estimated average glucose of 154    -   managed per primary team    4. Dyslipidemia: 2024 lipid panel: total cholesterol 297, triglycerides 90, HDL 39 and     -   Lipitor increased to 80 mg daily    -   will add Zetia 10 mg once a day    -   monitor LFTs and lipids in the outpatient setting    5. PAD: 24 CTA dissection protocol: severe vessel disease noted in left vertebral artery, celiac axis and left common femoral artery    -   Lipitor increased to 80 mg daily    -   will add Plavix 75 mg once a day    6. Mild cognitive impairment: reorient as needed    Subjective / Objective:   Patient seen and  "examined. No complaints of chest pain this morning. He is for nuclear stress test today to evaluate for ischemic burden.    Vitals: Blood pressure 165/72, pulse 74, temperature 98 °F (36.7 °C), resp. rate 20, height 5' 6\" (1.676 m), weight 66.6 kg (146 lb 13.2 oz), SpO2 97%.  Vitals:    07/21/24 2242 07/22/24 0708   Weight: 66.6 kg (146 lb 12.8 oz) 66.6 kg (146 lb 13.2 oz)     Body mass index is 23.7 kg/m².  BP Readings from Last 3 Encounters:   07/23/24 165/72     Orthostatic Blood Pressures      Flowsheet Row Most Recent Value   Blood Pressure 165/72 filed at 07/23/2024 0734   Patient Position - Orthostatic VS Lying filed at 07/21/2024 2200          I/O         07/21 0701  07/22 0700 07/22 0701  07/23 0700 07/23 0701  07/24 0700    P.O. 240 320     I.V. (mL/kg) 30 (0.5)      IV Piggyback 1050      Total Intake(mL/kg) 1320 (19.8) 320 (4.8)     Urine (mL/kg/hr) 1300 1200 (0.8)     Total Output 1300 1200     Net +20 -880                  Invasive Devices       Peripheral Intravenous Line  Duration             Peripheral IV 07/21/24 Right Antecubital 2 days    Peripheral IV 07/21/24 Left Antecubital 1 day    Peripheral IV 07/21/24 Left Forearm 1 day                      Intake/Output Summary (Last 24 hours) at 7/23/2024 0839  Last data filed at 7/22/2024 1845  Gross per 24 hour   Intake 320 ml   Output 1200 ml   Net -880 ml         Physical Exam:   Physical Exam  Vitals and nursing note reviewed.   Constitutional:       General: He is not in acute distress.     Appearance: Normal appearance. He is normal weight.   HENT:      Right Ear: External ear normal.      Left Ear: External ear normal.   Eyes:      General: No scleral icterus.        Right eye: No discharge.         Left eye: No discharge.   Cardiovascular:      Rate and Rhythm: Normal rate and regular rhythm.      Pulses: Normal pulses.   Pulmonary:      Effort: Pulmonary effort is normal. No respiratory distress.      Breath sounds: Normal breath sounds. "   Abdominal:      General: Bowel sounds are normal. There is no distension.      Palpations: Abdomen is soft.   Musculoskeletal:      Right lower leg: No edema.      Left lower leg: No edema.   Skin:     General: Skin is warm and dry.      Capillary Refill: Capillary refill takes less than 2 seconds.   Neurological:      General: No focal deficit present.      Mental Status: He is alert. Mental status is at baseline.   Psychiatric:         Mood and Affect: Mood normal.           Medications/ Allergies:     Current Facility-Administered Medications   Medication Dose Route Frequency Provider Last Rate    acetaminophen  650 mg Oral Q6H PRN Cuiyin Yurik, CRNP      amLODIPine  5 mg Oral Daily Cuiyin Yurik, CRNP      aspirin  81 mg Oral Daily Cuiyin Yurik, CRNP      atorvastatin  80 mg Oral Daily With Dinner Radha Dukem, CRNP      citalopram  20 mg Oral Daily Cuiyin Yurik, CRNP      dextromethorphan-guaiFENesin  10 mL Oral Q4H PRN Cuiyin Yurik, CRNP      divalproex sodium  250 mg Oral Daily Cuiyin Nohemyrik, CRNP      famotidine  20 mg Oral HS Cuiyin Yurik, CRNP      heparin (porcine)  3-30 Units/kg/hr (Order-Specific) Intravenous Titrated Cuiyin Yurik, CRNP 13 Units/kg/hr (07/22/24 2137)    heparin (porcine)  2,600 Units Intravenous Q6H PRN Cuiyin Yurik, CRNP      heparin (porcine)  5,200 Units Intravenous Q6H PRN Cuiyin Yurik, CRNP      insulin lispro  1-5 Units Subcutaneous TID AC Cuiyin Yurik, CRNP      insulin lispro  1-5 Units Subcutaneous HS Cuiyin Yurik, CRNP      memantine  10 mg Oral BID Cuiyin Yurik, CRNP      metoprolol tartrate  12.5 mg Oral Q12H CARLOS Cuiyin Yurik, CRNP      nitroglycerin  0.4 mg Sublingual Q5 Min PRN Cuiyin Yurik, CRNP      ondansetron  4 mg Intravenous Q6H PRN Cuiyin Yurik, CRNP      sodium chloride  1 g Oral Daily With Dinner Cujavi Slaterrik, CRNP       acetaminophen, 650 mg, Q6H PRN  dextromethorphan-guaiFENesin, 10 mL, Q4H PRN  heparin (porcine), 2,600 Units, Q6H PRN  heparin (porcine), 5,200  Units, Q6H PRN  nitroglycerin, 0.4 mg, Q5 Min PRN  ondansetron, 4 mg, Q6H PRN      No Known Allergies    VTE Pharmacologic Prophylaxis:   Heparin    Labs:   Troponins:  Results from last 7 days   Lab Units 07/22/24  1158 07/22/24  0352 07/21/24  2331 07/21/24  2109   HS TNI RAND ng/L 4,857* 4,485*  --   --    HSTNI D2 ng/L  --   --   --  286*   HSTNI D4 ng/L  --   --  1,585*  --      CBC with diff:  Results from last 7 days   Lab Units 07/23/24  0520 07/22/24 0351 07/21/24  1906   WBC Thousand/uL 9.92 10.94* 10.56*   HEMOGLOBIN g/dL 11.4* 12.1 12.0   HEMATOCRIT % 34.7* 36.2* 37.0   MCV fL 94 93 94   PLATELETS Thousands/uL 320 337 349   RBC Million/uL 3.68* 3.90 3.93   MCH pg 31.0 31.0 30.5   MCHC g/dL 32.9 33.4 32.4   RDW % 13.8 13.5 13.7   MPV fL 9.6 9.7 9.5   NRBC AUTO /100 WBCs 0 0 0     CMP:  Results from last 7 days   Lab Units 07/23/24 0520 07/22/24 0351 07/21/24  1906   SODIUM mmol/L 132* 134* 135   POTASSIUM mmol/L 4.1 4.3 3.8   CHLORIDE mmol/L 102 101 101   CO2 mmol/L 22 24 19*   ANION GAP mmol/L 8 9 15*   BUN mg/dL 21 19 20   CREATININE mg/dL 0.68 0.76 0.81   CALCIUM mg/dL 8.5 8.5 9.0   AST U/L  --   --  12*   ALT U/L  --   --  9   ALK PHOS U/L  --   --  74   TOTAL PROTEIN g/dL  --   --  6.8   ALBUMIN g/dL  --   --  3.9   TOTAL BILIRUBIN mg/dL  --   --  0.35   EGFR ml/min/1.73sq m 86 82 80     Magnesium:  Results from last 7 days   Lab Units 07/23/24 0520 07/22/24 0351 07/21/24  1906   MAGNESIUM mg/dL 2.0 2.2 1.6*     Coags:  Results from last 7 days   Lab Units 07/23/24  0520 07/23/24  0156 07/22/24  1926 07/22/24  1158 07/22/24  0352 07/21/24  1906   PTT seconds 61* 63* 69* 105* 174* 24   INR   --   --   --   --   --  1.06     TSH:  Results from last 7 days   Lab Units 07/22/24  0351   TSH 3RD GENERATON uIU/mL 1.888     Lipid Profile:  Results from last 7 days   Lab Units 07/22/24  0351   CHOLESTEROL mg/dL 297*   TRIGLYCERIDES mg/dL 90   HDL mg/dL 39*   LDL CALC mg/dL 240*     Hgb A1c:  Results from  last 7 days   Lab Units 07/22/24  1158   HEMOGLOBIN A1C % 7.0*       Imaging & Testing   I have personally reviewed pertinent reports.    XR chest portable    Result Date: 7/22/2024  Narrative: XR CHEST PORTABLE INDICATION: F/U on acute SOB episode after CT scan in ED. COMPARISON: 7/21/2024. FINDINGS: No focal airspace consolidation, pleural effusion, signs of congestion, or pneumothorax. Cardiomediastinal silhouette is unremarkable. Osseous structures are grossly intact.     Impression: No acute cardiopulmonary disease. Workstation performed: [id number]     Echo complete w/ contrast if indicated    Result Date: 7/22/2024  Narrative:   Left Ventricle: Left ventricular cavity size is normal. Wall thickness is mildly increased. The left ventricular ejection fraction is 45% by visual estimation. Systolic function is mildly reduced. Severe hypokinesis of basal-mid inferior and inferolaterla walls Diastolic function is abnormal.   Right Ventricle: Systolic function is normal. Normal tricuspid annular plane systolic excursion (TAPSE) > 1.7 cm.   Left Atrium: The atrium is mildly dilated.   Aortic Valve: There is mild to moderate regurgitation.   Mitral Valve: There is mild regurgitation.     XR chest 1 view portable    Result Date: 7/22/2024  Narrative: XR CHEST PORTABLE INDICATION: chest pain. COMPARISON: None FINDINGS: Clear lungs. No pneumothorax or pleural effusion. Normal cardiomediastinal silhouette. Bones are unremarkable for age. Normal upper abdomen.     Impression: No acute cardiopulmonary disease. Workstation performed: [id number]     CTA dissection protocol chest/abdomen/pelvis    Result Date: 7/21/2024  Narrative: CTA - CHEST, ABDOMEN AND PELVIS - WITHOUT AND WITH IV CONTRAST INDICATION: chest pain radiating to back. COMPARISON: None. TECHNIQUE: CT examination of the chest, abdomen and pelvis was performed both prior to and after the administration of intravenous contrast. The noncontrast portion of this  examination was performed utilizing low radiation dose technique.  Thin section angiographic arterial phase post contrast technique was used in order to evaluate for aortic dissection. 3D reformatted images and volume rendering were performed on an independent workstation. Multiplanar 2D reformatted images were created from the source data. Radiation dose length product (DLP) for this visit: 974 mGy-cm . This examination, like all CT scans performed in the [facility], was performed utilizing techniques to minimize radiation dose exposure, including the use of iterative reconstruction and automated exposure control. IV Contrast: 100 mL of iohexol (OMNIPAQUE) Enteric Contrast: Not administered. FINDINGS: AORTA: No aortic dissection or intramural hematoma. Infrarenal abdominal aortic aneurysm measuring up to 3.3 cm just proximal to the aortic bifurcation. 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 Severe vessel disease or occlusion at the V2 segment of the proximal left vertebral artery Severe stenosis with poststenotic dilatation at the celiac axis. At least moderate ostial stenosis of the takeoff of the left renal artery. 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 CHEST LUNGS: Lungs are clear. No tracheal or endobronchial lesion. PLEURA: Unremarkable. HEART/PULMONARY ARTERIAL TREE: Heavy atherosclerotic coronary artery calcification. MEDIASTINUM AND MIKAELA: Unremarkable. CHEST WALL AND LOWER NECK: Unremarkable. ABDOMEN LIVER/BILIARY TREE: Unremarkable. GALLBLADDER: No calcified gallstones. No pericholecystic inflammatory change. SPLEEN: Unremarkable. PANCREAS: Unremarkable. ADRENAL GLANDS: Unremarkable. KIDNEYS/URETERS: Simple renal cyst(s). Otherwise unremarkable kidneys. No hydronephrosis. STOMACH AND BOWEL: Colonic diverticulosis without findings of  "acute diverticulitis. APPENDIX: No findings to suggest appendicitis. ABDOMINOPELVIC CAVITY: No ascites. No pneumoperitoneum. No lymphadenopathy. PELVIS REPRODUCTIVE ORGANS: Unremarkable for patient's age. URINARY BLADDER: Unremarkable. ABDOMINAL WALL/INGUINAL REGIONS: Unremarkable. BONES: No acute fracture or suspicious osseous lesion.     Impression: No evidence of aortic dissection. Infrarenal abdominal aortic aneurysm measuring up to 3.3 cm just proximal to the aortic bifurcation Severe vessel disease as detailed above involving the left vertebral artery, celiac axis and left common femoral artery. Workstation performed: [id number]        EKG / Monitor: Personally reviewed.    Sinus rhythm      Radha BEYER  Cardiology      \"This note was completed in part utilizing [employer] direct voice recognition software.   Grammatical errors, random word insertion, spelling mistakes, and incomplete sentences may be an occasional consequence of the system secondary to software limitations, ambient noise and hardware issues.    Please read the chart carefully and recognize, using context, where substitutions have occurred.  If you have any questions or concerns about the context, text or information contained within the body of this dictation, please contact myself, the provider, for further clarification.\"  "

## 2024-07-23 NOTE — ASSESSMENT & PLAN NOTE
Patient presents with chest pain.  Per staff at [facility], patient was seen shaking in the dining room after dinner, so staff attended to him and he reported he had chest pain with radiation to back.  911 was called.  EKG showed V3 V4 ST depression, patient was given 4 baby aspirin by EMS.  Per ED note, chest pain resolved after 1 dose of nitro and 324mg of aspirin.   Initial EKG showed sinus rhythm with occasional PVC, ST depression V2 through V6  Initial troponin 117.  Repeat troponin [id number]  Continue heparin drip  Recommendations on possible catheterization due to abnormal stress test  Continue double product control, heart rate and blood pressure  Echocardiogram with wall motion abnormality as listed below  Left Ventricle: Left ventricular cavity size is normal. Wall thickness is mildly increased. The left ventricular ejection fraction is 45% by visual estimation. Systolic function is mildly reduced. Severe hypokinesis of basal-mid inferior and inferolaterla walls Diastolic function is abnormal.

## 2024-07-23 NOTE — PLAN OF CARE
Problem: PAIN - ADULT  Goal: Verbalizes/displays adequate comfort level or baseline comfort level  Description: Interventions:  - Encourage patient to monitor pain and request assistance  - Assess pain using appropriate pain scale  - Administer analgesics based on type and severity of pain and evaluate response  - Implement non-pharmacological measures as appropriate and evaluate response  - Consider cultural and social influences on pain and pain management  - Notify physician/advanced practitioner if interventions unsuccessful or patient reports new pain  Outcome: Progressing     Problem: INFECTION - ADULT  Goal: Absence or prevention of progression during hospitalization  Description: INTERVENTIONS:  - Assess and monitor for signs and symptoms of infection  - Monitor lab/diagnostic results  - Monitor all insertion sites, i.e. indwelling lines, tubes, and drains  - Monitor endotracheal if appropriate and nasal secretions for changes in amount and color  - [place] appropriate cooling/warming therapies per order  - Administer medications as ordered  - Instruct and encourage patient and family to use good hand hygiene technique  - Identify and instruct in appropriate isolation precautions for identified infection/condition  Outcome: Progressing     Problem: SAFETY ADULT  Goal: Patient will remain free of falls  Description: INTERVENTIONS:  - Educate patient/family on patient safety including physical limitations  - Instruct patient to call for assistance with activity   - Consult OT/PT to assist with strengthening/mobility   - Keep Call bell within reach  - Keep bed low and locked with side rails adjusted as appropriate  - Keep care items and personal belongings within reach  - Initiate and maintain comfort rounds  - Make Fall Risk Sign visible to staff  - Offer Toileting every 2 Hours, in advance of need  - Initiate/Maintain bed and chair alarm  - Obtain necessary fall risk management equipment: bed and chair  alarm   - Apply yellow socks and bracelet for high fall risk patients  - Consider moving patient to room near nurses station  Outcome: Progressing  Goal: Maintain or return to baseline ADL function  Description: INTERVENTIONS:  -  Assess patient's ability to carry out ADLs; assess patient's baseline for ADL function and identify physical deficits which impact ability to perform ADLs (bathing, care of mouth/teeth, toileting, grooming, dressing, etc.)  - Assess/evaluate cause of self-care deficits   - Assess range of motion  - Assess patient's mobility; develop plan if impaired  - Assess patient's need for assistive devices and provide as appropriate  - Encourage maximum independence but intervene and supervise when necessary  - Involve family in performance of ADLs  - Assess for home care needs following discharge   - Consider OT consult to assist with ADL evaluation and planning for discharge  - Provide patient education as appropriate  Outcome: Progressing  Goal: Maintains/Returns to pre admission functional level  Description: INTERVENTIONS:  - Perform AM-PAC 6 Click Basic Mobility/ Daily Activity assessment daily.  - Set and communicate daily mobility goal to care team and patient/family/caregiver.   - Collaborate with rehabilitation services on mobility goals if consulted  - Perform Range of Motion 3 times a day.  - Reposition patient every 2 hours.  - Dangle patient 3 times a day  - Stand patient 3 times a day  - Ambulate patient 3 times a day  - Out of bed to chair 3 times a day   - Out of bed for meals 3 times a day  - Out of bed for toileting  - Record patient progress and toleration of activity level   Outcome: Progressing     Problem: DISCHARGE PLANNING  Goal: Discharge to home or other facility with appropriate resources  Description: INTERVENTIONS:  - Identify barriers to discharge w/patient and caregiver  - Arrange for needed discharge resources and transportation as appropriate  - Identify discharge  learning needs (meds, wound care, etc.)  - Arrange for interpretive services to assist at discharge as needed  - Refer to Case Management Department for coordinating discharge planning if the patient needs post-hospital services based on physician/advanced practitioner order or complex needs related to functional status, cognitive ability, or social support system  Outcome: Progressing     Problem: Knowledge Deficit  Goal: Patient/family/caregiver demonstrates understanding of disease process, treatment plan, medications, and discharge instructions  Description: Complete learning assessment and assess knowledge base.  Interventions:  - Provide teaching at level of understanding  - Provide teaching via preferred learning methods  Outcome: Progressing     Problem: Prexisting or High Potential for Compromised Skin Integrity  Goal: Skin integrity is maintained or improved  Description: INTERVENTIONS:  - Identify patients at risk for skin breakdown  - Assess and monitor skin integrity  - Assess and monitor nutrition and hydration status  - Monitor labs   - Assess for incontinence   - Turn and reposition patient  - Assist with mobility/ambulation  - Relieve pressure over bony prominences  - Avoid friction and shearing  - Provide appropriate hygiene as needed including keeping skin clean and dry  - Evaluate need for skin moisturizer/barrier cream  - Collaborate with interdisciplinary team   - Patient/family teaching  - Consider wound care consult   Outcome: Progressing

## 2024-07-24 LAB
GLUCOSE SERPL-MCNC: 119 MG/DL (ref 65–140)
GLUCOSE SERPL-MCNC: 142 MG/DL (ref 65–140)
GLUCOSE SERPL-MCNC: 211 MG/DL (ref 65–140)
GLUCOSE SERPL-MCNC: 220 MG/DL (ref 65–140)

## 2024-07-24 PROCEDURE — 82948 REAGENT STRIP/BLOOD GLUCOSE: CPT

## 2024-07-24 PROCEDURE — 99232 SBSQ HOSP IP/OBS MODERATE 35: CPT | Performed by: INTERNAL MEDICINE

## 2024-07-24 PROCEDURE — 99233 SBSQ HOSP IP/OBS HIGH 50: CPT | Performed by: INTERNAL MEDICINE

## 2024-07-24 PROCEDURE — 99223 1ST HOSP IP/OBS HIGH 75: CPT | Performed by: PSYCHIATRY & NEUROLOGY

## 2024-07-24 RX ORDER — OLANZAPINE 2.5 MG/1
1.25 TABLET, FILM COATED ORAL EVERY 6 HOURS PRN
Status: DISCONTINUED | OUTPATIENT
Start: 2024-07-24 | End: 2024-07-25 | Stop reason: HOSPADM

## 2024-07-24 RX ORDER — OLANZAPINE 10 MG/2ML
1.25 INJECTION, POWDER, FOR SOLUTION INTRAMUSCULAR
Status: DISCONTINUED | OUTPATIENT
Start: 2024-07-24 | End: 2024-07-25 | Stop reason: HOSPADM

## 2024-07-24 RX ORDER — HEPARIN SODIUM 5000 [USP'U]/ML
5000 INJECTION, SOLUTION INTRAVENOUS; SUBCUTANEOUS EVERY 8 HOURS SCHEDULED
Status: DISCONTINUED | OUTPATIENT
Start: 2024-07-24 | End: 2024-07-25 | Stop reason: HOSPADM

## 2024-07-24 RX ADMIN — ISOSORBIDE DINITRATE 10 MG: 10 TABLET ORAL at 09:09

## 2024-07-24 RX ADMIN — ATORVASTATIN CALCIUM 80 MG: 80 TABLET, FILM COATED ORAL at 16:55

## 2024-07-24 RX ADMIN — CLOPIDOGREL 75 MG: 75 TABLET ORAL at 09:09

## 2024-07-24 RX ADMIN — HEPARIN SODIUM 5000 UNITS: 5000 INJECTION, SOLUTION INTRAVENOUS; SUBCUTANEOUS at 21:56

## 2024-07-24 RX ADMIN — INSULIN LISPRO 1 UNITS: 100 INJECTION, SOLUTION INTRAVENOUS; SUBCUTANEOUS at 12:22

## 2024-07-24 RX ADMIN — EZETIMIBE 10 MG: 10 TABLET ORAL at 21:56

## 2024-07-24 RX ADMIN — ISOSORBIDE DINITRATE 10 MG: 10 TABLET ORAL at 16:55

## 2024-07-24 RX ADMIN — CITALOPRAM HYDROBROMIDE 20 MG: 20 TABLET ORAL at 09:09

## 2024-07-24 RX ADMIN — DIVALPROEX SODIUM 250 MG: 250 TABLET, DELAYED RELEASE ORAL at 09:09

## 2024-07-24 RX ADMIN — FAMOTIDINE 20 MG: 20 TABLET, FILM COATED ORAL at 21:56

## 2024-07-24 RX ADMIN — HEPARIN SODIUM 5000 UNITS: 5000 INJECTION, SOLUTION INTRAVENOUS; SUBCUTANEOUS at 14:55

## 2024-07-24 RX ADMIN — AMLODIPINE BESYLATE 5 MG: 5 TABLET ORAL at 09:09

## 2024-07-24 RX ADMIN — MEMANTINE 10 MG: 10 TABLET ORAL at 09:09

## 2024-07-24 RX ADMIN — SODIUM CHLORIDE 1 G: 1 TABLET ORAL at 16:55

## 2024-07-24 RX ADMIN — ASPIRIN 81 MG CHEWABLE TABLET 81 MG: 81 TABLET CHEWABLE at 09:09

## 2024-07-24 RX ADMIN — INSULIN LISPRO 1 UNITS: 100 INJECTION, SOLUTION INTRAVENOUS; SUBCUTANEOUS at 21:57

## 2024-07-24 RX ADMIN — MEMANTINE 10 MG: 10 TABLET ORAL at 17:00

## 2024-07-24 NOTE — CONSULTS
"Consultation - Behavioral Health   Paul Stack 84 y.o. male MRN: [id number]  Unit/Bed#: [address] [place] [address] Encounter: [id number]      Chief Complaint: \"No complaints\"    History of Present Illness   Physician Requesting Consult: Tyshawn Pa,   Reason for Consult / Principal Problem: Dx 1.  Delirium    Paul Stack is a 84 y.o. male with past medical history of dementia, type II diabetes, hypertension, peripheral artery disease who presented to the ED for chest pain and is admitted for NSTEMI. Psychiatry consultation is requested due to delirium.  Patient was calm and cooperative with interview.  Patient appears to be pleasantly confused and was making jokes and laughing at times.  He stated that he thinks he is in the hospital but was not sure.  He does not know why he is in the hospital.  I discussed with patient why he is here upon start of interview and when asked again patient stated that he did not remember.  Patient is disoriented to time.  He was unable to state who the president is.  He reported not knowing the answers to many questions are asked of him.  Reports that his mood has been \"pretty normal\" and that he has felt depressed \"on and off\".  He reported having adequate sleep, appetite, interest.  Denies current suicidal or homicidal ideation, plan, or intent.  He repeatedly stated throughout the interview that he does not have complaints.  Attempted to assess for history of christina however patient did not appear to understand what was being asked.  Denies auditory or visual hallucinations.  Reports experiencing anxiety at times.  Denies using drugs or alcohol currently.  He was perseverative regarding his family and [place].  His thought process was concrete.      Collateral information obtaiend by patient's brother Sanjay ([phone])   Patient's brother patient has been living at his facility for about 8-10 years, and at baseline patient is aware of his surroundings and is social " "with others. Reports patient has had previous episodes of paranoia regarding feeling that people were taking his belongings, as well as episodes of agitation. Reports the facility manages patient's medications. States he is unaware of patient having a history of mental illness.      Historical Information   Past Psychiatric History:   Patient denies previous psychiatric hospitalizations.  Currently in treatment with PCP.  Past Suicide attempts: Patient states he is unsure  Past Violent behavior: Denies  Past Psychiatric medication trial: Per chart Celexa, Depakote    Substance Abuse History:  Denies current drug, alcohol, or tobacco use.  Reports he is a former smoker       Family Psychiatric History:   Unknown    Social History  Education: Reports he completed high school  Learning Disabilities: None is reported  Marital history: Patient states he thinks he is .  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  Other Pertinent History: None is reported    Traumatic History:   Abuse: Reports history of being bullied as a child.  States that he \"can't recall\" if he has experienced other abuse  Other Traumatic Events: None is reported    Past Medical History:   Diagnosis Date    Dementia (HCC)     DM (diabetes mellitus) (HCC)     HTN (hypertension)     Hyponatremia        Medical Review Of Systems:    Reports experiencing chest pain.    Meds/Allergies     all current active meds have been reviewed and current meds:   Current Facility-Administered Medications   Medication Dose Route Frequency    acetaminophen (TYLENOL) tablet 650 mg  650 mg Oral Q6H PRN    amLODIPine (NORVASC) tablet 5 mg  5 mg Oral Daily    aspirin chewable tablet 81 mg  81 mg Oral Daily    atorvastatin (LIPITOR) tablet 80 mg  80 mg Oral Daily With Dinner    citalopram " "(CeleXA) tablet 20 mg  20 mg Oral Daily    clopidogrel (PLAVIX) tablet 75 mg  75 mg Oral Daily    dextromethorphan-guaiFENesin (ROBITUSSIN DM) oral syrup 10 mL  10 mL Oral Q4H PRN    divalproex sodium (DEPAKOTE) DR tablet 250 mg  250 mg Oral Daily    ezetimibe (ZETIA) tablet 10 mg  10 mg Oral HS    famotidine (PEPCID) tablet 20 mg  20 mg Oral HS    insulin lispro (HumALOG/ADMELOG) 100 units/mL subcutaneous injection 1-5 Units  1-5 Units Subcutaneous TID AC    insulin lispro (HumALOG/ADMELOG) 100 units/mL subcutaneous injection 1-5 Units  1-5 Units Subcutaneous HS    isosorbide dinitrate (ISORDIL) tablet 10 mg  10 mg Oral TID after meals    memantine (NAMENDA) tablet 10 mg  10 mg Oral BID    nitroglycerin (NITROSTAT) SL tablet 0.4 mg  0.4 mg Sublingual Q5 Min PRN    ondansetron (ZOFRAN) injection 4 mg  4 mg Intravenous Q6H PRN    sodium chloride tablet 1 g  1 g Oral Daily With Dinner     No Known Allergies    Objective     Vital signs in last 24 hours:  Temp:  [98.7 °F (37.1 °C)] 98.7 °F (37.1 °C)  HR:  [64-78] 78  Resp:  [17-20] 17  BP: (101-152)/(43-68) 134/56      Intake/Output Summary (Last 24 hours) at 7/24/2024 1022  Last data filed at 7/24/2024 0835  Gross per 24 hour   Intake 120 ml   Output --   Net 120 ml       Mental Status Evaluation:  Appearance:  appears stated age and laying in bed   Behavior:  calm, cooperative, and pleasant   Speech:  normal rate and normal volume   Mood:  \"Pretty normal\"   Affect:  Brightens and laughing at times   Language: Unable to fully assess   Thought Process:  concrete, perserverative, and poverty of thought   Associations: concrete associations, perseverative   Thought Content:  no delusions elicited   Perceptual Disturbances: Denies auditory or visual hallucinations and Does not appear to be responding to internal stimuli   Risk Potential: Denies current suicidal or homicidal ideation, plan, or intent   Sensorium:  person   Memory:  Appears impaired   Cognition:  Appears " "impaired   Consciousness:  alert and awake    Attention: Decreased.  Patient able to spell the word \"world\" forwards however not backwards.  Was unable to subtract 100-7   Intellect: not examined   Fund of Knowledge: awareness of current events: poor   Insight:  poor   Judgment: moderate   Muscle Strength and Tone: Not assessed   Gait/Station: not assessed, patient in bed   Motor Activity: no abnormal movements     Lab Results: I have personally reviewed all pertinent laboratory/tests results.     Most Recent Labs:   Lab Results   Component Value Date    WBC 9.92 07/23/2024    RBC 3.68 (L) 07/23/2024    HGB 11.4 (L) 07/23/2024    HCT 34.7 (L) 07/23/2024     07/23/2024    RDW 13.8 07/23/2024    NEUTROABS 6.70 07/23/2024    SODIUM 132 (L) 07/23/2024    K 4.1 07/23/2024     07/23/2024    CO2 22 07/23/2024    BUN 21 07/23/2024    CREATININE 0.68 07/23/2024    GLUC 119 07/23/2024    CALCIUM 8.5 07/23/2024    AST 12 (L) 07/21/2024    ALT 9 07/21/2024    ALKPHOS 74 07/21/2024    TP 6.8 07/21/2024    ALB 3.9 07/21/2024    TBILI 0.35 07/21/2024    CHOLESTEROL 297 (H) 07/22/2024    HDL 39 (L) 07/22/2024    TRIG 90 07/22/2024    LDLCALC 240 (H) 07/22/2024    APX7NPUZCVFQ 1.888 07/22/2024    HGBA1C 7.0 (H) 07/22/2024     07/22/2024         EKG/Pathology/Other Studies:   Lab Results   Component Value Date    VENTRATE 65 07/21/2024    ATRIALRATE 65 07/21/2024    PRINT (220 - Age)*100% 07/23/2024    PRINT non-limiting 07/23/2024    PRINT 68 07/23/2024    PRINT 134 07/23/2024    PRINT Protocol Complete 07/23/2024    PRINT Chest Discomfort 07/23/2024    QRSDINT 92 07/21/2024    QTINT 450 07/21/2024    QTCINT 468 07/21/2024    PAXIS 29 07/21/2024    QRSAXIS 59 07/21/2024    TWAVEAXIS -74 07/21/2024        Code Status: Level 1 - Full Code  Advance Directive and Living Will:      Power of :    POLST:        Assessment & Plan     Assessment:  Pual Stack is a 84 y.o. male with past medical history of " "dementia, type II diabetes, hypertension, peripheral artery disease who presented to the ED for chest pain and is admitted for NSTEMI. Psychiatry consultation is requested due to delirium.  Patient was calm, cooperative, pleasantly confused, was making jokes and laughing at times.  He did not appear to understand that he is in the hospital did not know why he was here which was subsequently explained to him however he did not remember this when asked again at the end of the interview.  He exhibited poor attention and concentration.  He was oriented to person.  His thought process was concrete and perseverative.  He appears to be possibly confabulating at times.  He reports that his mood has been \"pretty normal\" and that he has felt depressed \"on and off\".  Patient denies current suicidal or homicidal ideation, plan, or intent.  Attempted to assess for history of christina however patient did not appear to understand what was being asked-he does not exhibit current christina/hypomania on examination.  Does not endorse current psychotic symptoms. I suspect patient is experiencing delirium secondary to his medical conditions including his NSTEMI and respiratory failure.  Patient did not exhibit overt agitation or aggressive behaviors during the interview.  Patient currently is prescribed citalopram 20 mg daily.  Per the FDA, citalopram is not recommended in patients after an acute myocardial infarction and therefore will discontinue this medication though will replace it by starting sertraline 50 mg daily.  Patient is also taking Depakote 250 mg daily which is a lower dosage and therefore probably not exhibiting much therapeutic effect, it is also associated with multiple adverse effects, requires monitoring including blood levels, and generally is not considered to be effective for treatment of neuropsychiatric symptoms of dementia therefore recommend discontinuing this medication.  Collateral information was obtained from " patient's brother as noted above, recommendations regarding medication changes discussed. There is no clear indication for inpatient psychiatric hospitalization at this time.      Diagnosis:  Delirium  Dementia with behavioral disturbance      Plan:   Continue medical management  There is no clear indication for inpatient psychiatric hospitalization at this time  Discontinue citalopram  Start sertraline 50 mg daily, starting tomorrow morning  Discontinue Depakote  Avoid utilizing benzodiazapines if possible as this is associated with worsening of delirium as well as increased risks of adverse effects in patients with dementia and are not recommended in the treatment of neuropsychiatric symptoms of dementia  Zyprexa 1.25 mg q6hr prn agitation with Zyprexa 1.3 mg IM alternative if oral route is not available  Do not administer IM/IV benzodiazapines if possible within 1 hour of administration of IM Zyprexa as this is associated with excessive sedation and cardiorespiratory suppression   Avoid utilizing benzodiazapines if possible as this is associated with worsening of delirium   Maintain delirium precautions:  Encourage mobility, family involvement, frequent reorientation, and cognitive stimulation.  If patient uses assistive devices (eye glasses, hearing aids), please make sure that they are working properly.  Continue to encourage adequate hydration, nutrition and monitor bowel movements.  Maintain sleep-wake cycle. Maintain optima pain control.  Discussed with the primary team  Psychiatry will follow up as necessary.  Please contact with questions  Follow-up with PCP outpatient for management of psychiatric medications  For after hours and weekends please contact JOSÉ MIGUEL ([phone]) for psychiatric purposes     Risks, benefits and possible side effects of Medications:   Unable to have discussion due to patient factors        Bulmaro Potts DO  07/24/24      This note has been constructed using a voice  recognition system.    There may be translation, syntax,  or grammatical errors. If you have any questions, please contact the dictating provider.

## 2024-07-24 NOTE — ASSESSMENT & PLAN NOTE
Continue Namenda, Celexa and Depakote  Now with delirium on top of dementia  Appreciate psychiatry recommendations

## 2024-07-24 NOTE — PLAN OF CARE
Problem: PAIN - ADULT  Goal: Verbalizes/displays adequate comfort level or baseline comfort level  Description: Interventions:  - Encourage patient to monitor pain and request assistance  - Assess pain using appropriate pain scale  - Administer analgesics based on type and severity of pain and evaluate response  - Implement non-pharmacological measures as appropriate and evaluate response  - Consider cultural and social influences on pain and pain management  - Notify physician/advanced practitioner if interventions unsuccessful or patient reports new pain  Outcome: Progressing     Problem: INFECTION - ADULT  Goal: Absence or prevention of progression during hospitalization  Description: INTERVENTIONS:  - Assess and monitor for signs and symptoms of infection  - Monitor lab/diagnostic results  - Monitor all insertion sites, i.e. indwelling lines, tubes, and drains  - Monitor endotracheal if appropriate and nasal secretions for changes in amount and color  - [place] appropriate cooling/warming therapies per order  - Administer medications as ordered  - Instruct and encourage patient and family to use good hand hygiene technique  - Identify and instruct in appropriate isolation precautions for identified infection/condition  Outcome: Progressing     Problem: SAFETY ADULT  Goal: Patient will remain free of falls  Description: INTERVENTIONS:  - Educate patient/family on patient safety including physical limitations  - Instruct patient to call for assistance with activity   - Consult OT/PT to assist with strengthening/mobility   - Keep Call bell within reach  - Keep bed low and locked with side rails adjusted as appropriate  - Keep care items and personal belongings within reach  - Initiate and maintain comfort rounds  - Make Fall Risk Sign visible to staff  - Offer Toileting every 2 Hours, in advance of need  - Initiate/Maintain bed/chair alarm  - Obtain necessary fall risk management equipment: bracelet/socks   -  Apply yellow socks and bracelet for high fall risk patients  - Consider moving patient to room near nurses station  Outcome: Progressing  Goal: Maintain or return to baseline ADL function  Description: INTERVENTIONS:  -  Assess patient's ability to carry out ADLs; assess patient's baseline for ADL function and identify physical deficits which impact ability to perform ADLs (bathing, care of mouth/teeth, toileting, grooming, dressing, etc.)  - Assess/evaluate cause of self-care deficits   - Assess range of motion  - Assess patient's mobility; develop plan if impaired  - Assess patient's need for assistive devices and provide as appropriate  - Encourage maximum independence but intervene and supervise when necessary  - Involve family in performance of ADLs  - Assess for home care needs following discharge   - Consider OT consult to assist with ADL evaluation and planning for discharge  - Provide patient education as appropriate  Outcome: Progressing  Goal: Maintains/Returns to pre admission functional level  Description: INTERVENTIONS:  - Perform AM-PAC 6 Click Basic Mobility/ Daily Activity assessment daily.  - Set and communicate daily mobility goal to care team and patient/family/caregiver.   - Collaborate with rehabilitation services on mobility goals if consulted  - Perform Range of Motion 12 times a day.  - Reposition patient every 2 hours.  - Dangle patient 3 times a day  - Stand patient 3 times a day  - Ambulate patient 3 times a day  - Out of bed to chair 3 times a day   - Out of bed for meals 3 times a day  - Out of bed for toileting  - Record patient progress and toleration of activity level   Outcome: Progressing     Problem: DISCHARGE PLANNING  Goal: Discharge to home or other facility with appropriate resources  Description: INTERVENTIONS:  - Identify barriers to discharge w/patient and caregiver  - Arrange for needed discharge resources and transportation as appropriate  - Identify discharge learning  needs (meds, wound care, etc.)  - Arrange for interpretive services to assist at discharge as needed  - Refer to Case Management Department for coordinating discharge planning if the patient needs post-hospital services based on physician/advanced practitioner order or complex needs related to functional status, cognitive ability, or social support system  Outcome: Progressing     Problem: Knowledge Deficit  Goal: Patient/family/caregiver demonstrates understanding of disease process, treatment plan, medications, and discharge instructions  Description: Complete learning assessment and assess knowledge base.  Interventions:  - Provide teaching at level of understanding  - Provide teaching via preferred learning methods  Outcome: Progressing     Problem: Prexisting or High Potential for Compromised Skin Integrity  Goal: Skin integrity is maintained or improved  Description: INTERVENTIONS:  - Identify patients at risk for skin breakdown  - Assess and monitor skin integrity  - Assess and monitor nutrition and hydration status  - Monitor labs   - Assess for incontinence   - Turn and reposition patient  - Assist with mobility/ambulation  - Relieve pressure over bony prominences  - Avoid friction and shearing  - Provide appropriate hygiene as needed including keeping skin clean and dry  - Evaluate need for skin moisturizer/barrier cream  - Collaborate with interdisciplinary team   - Patient/family teaching  - Consider wound care consult   Outcome: Progressing

## 2024-07-24 NOTE — PROGRESS NOTES
Progress Note - Cardiology   [facility]     Paul Stack 84 y.o. male MRN: [id number]  : 1940  Unit/Bed#: [address] [place] [address] Encounter: [id number]    Assessment and Plan:   1. NSTEMI: patient presented with 6/10 chest discomfort which radiated to his back    -   hs trop: 117 (0hr), 403 (2hr), 1702 (4hr)    -   random hs trop: 4485 and 4457    -   admission EKG demonstrated ST depression of more than 3 mm in anterior lateral leads    -   patient started on IV heparin ACS protocol in the emergency room, will complete 2024 10 PM    -   not started on beta blocker due to bradycardia    -   2024 TTE: EF visibly estimated 45% with severe hypo kinesis of basal mid inferior and inferior lateral walls, there is mild to moderate aortic regurgitation and left atrium is mildly dilated    -   continue dual antiplatelet therapy with aspirin and Plavix daily    -   continue Lipitor 80 mg daily    -   on Pepcid for G.I. prophylaxis    -   2024 Lexiscan nuclear stress test: patient experience 5/10 chest pain post vasodilation. Perfusion with large inferior territory of ischemia/possible prior infarction with Elma-infarction ischemia    -   results were discussed with family, at this time they wish to pursue goal-directed medical therapy    -   Isordil 10 mg tid is on 2024     2. Hypertension: blood pressures elevated    -   continue Norvasc 5 mg daily    -   continue Cozaar 25 mg once a day    -   monitor blood pressures and titrate as needed     3. Diabetes: A1c 7 with estimated average glucose of 154    -   managed per primary team     4. Dyslipidemia: 2024 lipid panel: total cholesterol 297, triglycerides 90, HDL 39 and     -   Lipitor increased to 80 mg daily    -   continue Zetia 10 mg once a day    -   monitor LFTs and lipids in the outpatient setting     5. PAD: 24 CTA dissection protocol: severe vessel disease noted in left vertebral artery, celiac axis and  "left common femoral artery    -   Lipitor increased to 80 mg daily    -   will add Plavix 75 mg once a day     6. Mild cognitive impairment: reorient as needed    Subjective / Objective:   Patient seen and examined. No further complaints of chest discomfort. Plan as above    Vitals: Blood pressure 134/56, pulse 78, temperature 98.7 °F (37.1 °C), resp. rate 17, height 5' 6\" (1.676 m), weight 66.6 kg (146 lb 13.2 oz), SpO2 95%.  Vitals:    07/21/24 2242 07/22/24 0708   Weight: 66.6 kg (146 lb 12.8 oz) 66.6 kg (146 lb 13.2 oz)     Body mass index is 23.7 kg/m².  BP Readings from Last 3 Encounters:   07/24/24 134/56     Orthostatic Blood Pressures      Flowsheet Row Most Recent Value   Blood Pressure 134/56 filed at 07/24/2024 0908   Patient Position - Orthostatic VS Lying filed at 07/24/2024 0908          I/O         07/22 0701  07/23 0700 07/23 0701  07/24 0700 07/24 0701  07/25 0700    P.O. 320  120    I.V. (mL/kg)       IV Piggyback       Total Intake(mL/kg) 320 (4.8)  120 (1.8)    Urine (mL/kg/hr) 1200 (0.8)      Total Output 1200      Net -880  +120                 Invasive Devices       Peripheral Intravenous Line  Duration             Peripheral IV 07/21/24 Left Antecubital 2 days                      Intake/Output Summary (Last 24 hours) at 7/24/2024 0939  Last data filed at 7/24/2024 0835  Gross per 24 hour   Intake 120 ml   Output --   Net 120 ml         Physical Exam:   Physical Exam  Vitals and nursing note reviewed.   Constitutional:       General: He is not in acute distress.     Appearance: Normal appearance. He is normal weight.   HENT:      Right Ear: External ear normal.      Left Ear: External ear normal.   Eyes:      General: No scleral icterus.        Right eye: No discharge.         Left eye: No discharge.   Cardiovascular:      Rate and Rhythm: Normal rate and regular rhythm.      Pulses: Normal pulses.      Heart sounds: Murmur heard.   Pulmonary:      Effort: Pulmonary effort is normal. No " accessory muscle usage or respiratory distress.      Breath sounds: Examination of the right-lower field reveals decreased breath sounds. Examination of the left-lower field reveals decreased breath sounds. Decreased breath sounds present.   Abdominal:      General: Bowel sounds are normal. There is no distension.      Palpations: Abdomen is soft.   Musculoskeletal:      Right lower leg: No edema.      Left lower leg: No edema.   Skin:     General: Skin is warm and dry.      Capillary Refill: Capillary refill takes less than 2 seconds.   Neurological:      General: No focal deficit present.      Mental Status: He is alert. Mental status is at baseline.   Psychiatric:         Mood and Affect: Mood normal.              Medications/ Allergies:     Current Facility-Administered Medications   Medication Dose Route Frequency Provider Last Rate    acetaminophen  650 mg Oral Q6H PRN Cuiyin Yurik, CRNP      amLODIPine  5 mg Oral Daily Cuiyin Yurik, CRNP      aspirin  81 mg Oral Daily Cuiyin Yurik, CRNP      atorvastatin  80 mg Oral Daily With Dinner Radha Giles, CRNP      citalopram  20 mg Oral Daily Cuiyin Yurik, CRNP      clopidogrel  75 mg Oral Daily [facility], CRNP      dextromethorphan-guaiFENesin  10 mL Oral Q4H PRN Cuiyin Yurik, CRNP      divalproex sodium  250 mg Oral Daily Cuiyin Yurik, CRNP      ezetimibe  10 mg Oral HS Radha [facility], CRNP      famotidine  20 mg Oral HS Cuiyin Yurik, CRNP      heparin (porcine)  2,600 Units Intravenous Q6H PRN Cuiyin Yurik, CRNP      heparin (porcine)  5,200 Units Intravenous Q6H PRN Cuiyin Yurik, CRNP      insulin lispro  1-5 Units Subcutaneous TID AC Cuiyin Yurik, CRNP      insulin lispro  1-5 Units Subcutaneous HS Cuiyin Yurik, CRNP      isosorbide dinitrate  10 mg Oral TID after meals Angelo Boateng MD      memantine  10 mg Oral BID Cuiyin Yurik, CRNP      nitroglycerin  0.4 mg Sublingual Q5 Min PRN Cuiyin Yurik, CRNP      ondansetron  4 mg Intravenous Q6H PRN Cuiyin Yurik,  CRNP      sodium chloride  1 g Oral Daily With Dinner Attila Garrison, CRNP       acetaminophen, 650 mg, Q6H PRN  dextromethorphan-guaiFENesin, 10 mL, Q4H PRN  heparin (porcine), 2,600 Units, Q6H PRN  heparin (porcine), 5,200 Units, Q6H PRN  nitroglycerin, 0.4 mg, Q5 Min PRN  ondansetron, 4 mg, Q6H PRN      No Known Allergies    VTE Pharmacologic Prophylaxis:   Sequential compression device (Venodyne)     Labs:   Troponins:  Results from last 7 days   Lab Units 07/22/24  1158 07/22/24  0352 07/21/24  2331 07/21/24  2109   HS TNI RAND ng/L 4,857* 4,485*  --   --    HSTNI D2 ng/L  --   --   --  286*   HSTNI D4 ng/L  --   --  1,585*  --      CBC with diff:  Results from last 7 days   Lab Units 07/23/24  0520 07/22/24  0351 07/21/24  1906   WBC Thousand/uL 9.92 10.94* 10.56*   HEMOGLOBIN g/dL 11.4* 12.1 12.0   HEMATOCRIT % 34.7* 36.2* 37.0   MCV fL 94 93 94   PLATELETS Thousands/uL 320 337 349   RBC Million/uL 3.68* 3.90 3.93   MCH pg 31.0 31.0 30.5   MCHC g/dL 32.9 33.4 32.4   RDW % 13.8 13.5 13.7   MPV fL 9.6 9.7 9.5   NRBC AUTO /100 WBCs 0 0 0     CMP:  Results from last 7 days   Lab Units 07/23/24  0520 07/22/24  0351 07/21/24  1906   SODIUM mmol/L 132* 134* 135   POTASSIUM mmol/L 4.1 4.3 3.8   CHLORIDE mmol/L 102 101 101   CO2 mmol/L 22 24 19*   ANION GAP mmol/L 8 9 15*   BUN mg/dL 21 19 20   CREATININE mg/dL 0.68 0.76 0.81   CALCIUM mg/dL 8.5 8.5 9.0   AST U/L  --   --  12*   ALT U/L  --   --  9   ALK PHOS U/L  --   --  74   TOTAL PROTEIN g/dL  --   --  6.8   ALBUMIN g/dL  --   --  3.9   TOTAL BILIRUBIN mg/dL  --   --  0.35   EGFR ml/min/1.73sq m 86 82 80     Magnesium:  Results from last 7 days   Lab Units 07/23/24  0520 07/22/24  0351 07/21/24  1906   MAGNESIUM mg/dL 2.0 2.2 1.6*     Coags:  Results from last 7 days   Lab Units 07/23/24  0520 07/23/24  0156 07/22/24  1926 07/22/24  1158 07/22/24  0352 07/21/24  1906   PTT seconds 61* 63* 69* 105* 174* 24   INR   --   --   --   --   --  1.06     TSH:  Results from  last 7 days   Lab Units 07/22/24  0351   TSH 3RD GENERATON uIU/mL 1.888     Lipid Profile:  Results from last 7 days   Lab Units 07/22/24  0351   CHOLESTEROL mg/dL 297*   TRIGLYCERIDES mg/dL 90   HDL mg/dL 39*   LDL CALC mg/dL 240*     Hgb A1c:  Results from last 7 days   Lab Units 07/22/24  1158   HEMOGLOBIN A1C % 7.0*       Imaging & Testing   I have personally reviewed pertinent reports.    Stress strip    Result Date: 7/23/2024  Narrative: Confirmed by AUSTIN SWEENEY ([id number]),  Jen Hodges ([id number]) on 7/23/2024 2:57:19 PM    NM Myocardial Perfusion Spect (Pharmacological Induced Stress and/or Rest)    Result Date: 7/23/2024  Narrative:   Stress ECG: The stress ECG is equivocal for ischemia after pharmacologic vasodilation, with reproduction of symptoms.   Stress ECG: The patient after exercising for 1 min and had a maximal HR of 81 bpm (60% of MPHR) and 1.0 METS. The patient experienced no angina during the test. The patient reported chest pain (atypical) during the stress test. Symptoms began at minute 1 post lexiscan infusion and ended at minute 5 during recovery.   Perfusion: There is a left ventricular perfusion defect that is medium to large in size present in the entire inferior and inferoseptal location(s) that is predominantly fixed. There is a left ventricular perfusion defect that is medium in size present in the mid to basal inferolateral location(s) that is predominantly reversible.   Stress Combined Conclusion: Left ventricular perfusion is abnormal.   Stress Function: Stress ejection fraction is 36%.   Perfusion Defect Conclusion: The stress/rest perfusion ratio is 1.12. Abnormal study. Patient with 5/10 chest pain post vasodilation. SSS 14 SRS 9 SDS 5. Large inferior territory of ischemia/possible prior infarction with ben-infarct ischemia.     XR chest portable    Result Date: 7/22/2024  Narrative: XR CHEST PORTABLE INDICATION: F/U on acute SOB episode after CT scan in ED. COMPARISON:  7/21/2024. FINDINGS: No focal airspace consolidation, pleural effusion, signs of congestion, or pneumothorax. Cardiomediastinal silhouette is unremarkable. Osseous structures are grossly intact.     Impression: No acute cardiopulmonary disease. Workstation performed: [id number]     Echo complete w/ contrast if indicated    Result Date: 7/22/2024  Narrative:   Left Ventricle: Left ventricular cavity size is normal. Wall thickness is mildly increased. The left ventricular ejection fraction is 45% by visual estimation. Systolic function is mildly reduced. Severe hypokinesis of basal-mid inferior and inferolaterla walls Diastolic function is abnormal.   Right Ventricle: Systolic function is normal. Normal tricuspid annular plane systolic excursion (TAPSE) > 1.7 cm.   Left Atrium: The atrium is mildly dilated.   Aortic Valve: There is mild to moderate regurgitation.   Mitral Valve: There is mild regurgitation.     XR chest 1 view portable    Result Date: 7/22/2024  Narrative: XR CHEST PORTABLE INDICATION: chest pain. COMPARISON: None FINDINGS: Clear lungs. No pneumothorax or pleural effusion. Normal cardiomediastinal silhouette. Bones are unremarkable for age. Normal upper abdomen.     Impression: No acute cardiopulmonary disease. Workstation performed: [id number]     CTA dissection protocol chest/abdomen/pelvis    Result Date: 7/21/2024  Narrative: CTA - CHEST, ABDOMEN AND PELVIS - WITHOUT AND WITH IV CONTRAST INDICATION: chest pain radiating to back. COMPARISON: None. TECHNIQUE: CT examination of the chest, abdomen and pelvis was performed both prior to and after the administration of intravenous contrast. The noncontrast portion of this examination was performed utilizing low radiation dose technique.  Thin section angiographic arterial phase post contrast technique was used in order to evaluate for aortic dissection. 3D reformatted images and volume rendering were performed on an independent workstation.  Multiplanar 2D reformatted images were created from the source data. Radiation dose length product (DLP) for this visit: 974 mGy-cm . This examination, like all CT scans performed in the [facility], was performed utilizing techniques to minimize radiation dose exposure, including the use of iterative reconstruction and automated exposure control. IV Contrast: 100 mL of iohexol (OMNIPAQUE) Enteric Contrast: Not administered. FINDINGS: AORTA: No aortic dissection or intramural hematoma. Infrarenal abdominal aortic aneurysm measuring up to 3.3 cm just proximal to the aortic bifurcation. 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 Severe vessel disease or occlusion at the V2 segment of the proximal left vertebral artery Severe stenosis with poststenotic dilatation at the celiac axis. At least moderate ostial stenosis of the takeoff of the left renal artery. 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 CHEST LUNGS: Lungs are clear. No tracheal or endobronchial lesion. PLEURA: Unremarkable. HEART/PULMONARY ARTERIAL TREE: Heavy atherosclerotic coronary artery calcification. MEDIASTINUM AND MIKAELA: Unremarkable. CHEST WALL AND LOWER NECK: Unremarkable. ABDOMEN LIVER/BILIARY TREE: Unremarkable. GALLBLADDER: No calcified gallstones. No pericholecystic inflammatory change. SPLEEN: Unremarkable. PANCREAS: Unremarkable. ADRENAL GLANDS: Unremarkable. KIDNEYS/URETERS: Simple renal cyst(s). Otherwise unremarkable kidneys. No hydronephrosis. STOMACH AND BOWEL: Colonic diverticulosis without findings of acute diverticulitis. APPENDIX: No findings to suggest appendicitis. ABDOMINOPELVIC CAVITY: No ascites. No pneumoperitoneum. No lymphadenopathy. PELVIS REPRODUCTIVE ORGANS: Unremarkable for patient's age. URINARY BLADDER: Unremarkable. ABDOMINAL WALL/INGUINAL REGIONS:  "Unremarkable. BONES: No acute fracture or suspicious osseous lesion.     Impression: No evidence of aortic dissection. Infrarenal abdominal aortic aneurysm measuring up to 3.3 cm just proximal to the aortic bifurcation Severe vessel disease as detailed above involving the left vertebral artery, celiac axis and left common femoral artery. Workstation performed: [id number]        EKG / Monitor: Personally reviewed.    Sinus rhythm        Radha BEYER  Cardiology      \"This note was completed in part utilizing [email or web address] fluency direct voice recognition software.   Grammatical errors, random word insertion, spelling mistakes, and incomplete sentences may be an occasional consequence of the system secondary to software limitations, ambient noise and hardware issues.    Please read the chart carefully and recognize, using context, where substitutions have occurred.  If you have any questions or concerns about the context, text or information contained within the body of this dictation, please contact myself, the provider, for further clarification.\"  "

## 2024-07-24 NOTE — ASSESSMENT & PLAN NOTE
Lab Results   Component Value Date    HGBA1C 7.0 (H) 07/22/2024       Recent Labs     07/23/24  1650 07/23/24 2015 07/24/24  0716 07/24/24  1116   POCGLU 175* 136 119 211*         Blood Sugar Average: Last 72 hrs:  (P) [id number]    Resume SSI regimen

## 2024-07-24 NOTE — ASSESSMENT & PLAN NOTE
Patient presents with chest pain.  Per staff at [facility], patient was seen shaking in the dining room after dinner, so staff attended to him and he reported he had chest pain with radiation to back.  911 was called.  EKG showed V3 V4 ST depression, patient was given 4 baby aspirin by EMS.  Per ED note, chest pain resolved after 1 dose of nitro and 324mg of aspirin.   Initial EKG showed sinus rhythm with occasional PVC, ST depression V2 through V6  Initial troponin 117.  Repeat troponin 403-1702  Continue double product control, heart rate and blood pressure  Continue dual antiplatelet therapy, Plavix and aspirin  Patient started on Imdur  Echocardiogram with wall motion abnormality as listed below  Left Ventricle: Left ventricular cavity size is normal. Wall thickness is mildly increased. The left ventricular ejection fraction is 45% by visual estimation. Systolic function is mildly reduced. Severe hypokinesis of basal-mid inferior and inferolaterla walls Diastolic function is abnormal.   Cardiology plans to medically manage patient's MI, should patient not have any symptoms can consider medical management alone.

## 2024-07-24 NOTE — PROGRESS NOTES
[facility]  Progress Note  Name: Paul Stack I  MRN: [id number]  Unit/Bed#: [address] [place] [address] I Date of Admission: 7/21/2024   Date of Service: 7/24/2024 I Hospital Day: 3    Assessment & Plan   * NSTEMI (non-ST elevated myocardial infarction) ([facility])  Assessment & Plan  Patient presents with chest pain.  Per staff at [facility], patient was seen shaking in the dining room after dinner, so staff attended to him and he reported he had chest pain with radiation to back.  911 was called.  EKG showed V3 V4 ST depression, patient was given 4 baby aspirin by EMS.  Per ED note, chest pain resolved after 1 dose of nitro and 324mg of aspirin.   Initial EKG showed sinus rhythm with occasional PVC, ST depression V2 through V6  Initial troponin 117.  Repeat troponin [id number]  Continue double product control, heart rate and blood pressure  Continue dual antiplatelet therapy, Plavix and aspirin  Patient started on Imdur  Echocardiogram with wall motion abnormality as listed below  Left Ventricle: Left ventricular cavity size is normal. Wall thickness is mildly increased. The left ventricular ejection fraction is 45% by visual estimation. Systolic function is mildly reduced. Severe hypokinesis of basal-mid inferior and inferolaterla walls Diastolic function is abnormal.   Cardiology plans to medically manage patient's MI, should patient not have any symptoms can consider medical management alone.      Acute hypoxemic respiratory failure (HCC)  Assessment & Plan  Initially required 4 L of oxygen, now on room air  CT Chest did not reveal any acute pulmonary process.    NSTEMI/Cardiac workup as above.     Abnormal CT scan  Assessment & Plan  CT notes infrarenal abdominal aortic aneurysm measuring up to 3.3 cm just proximal to the aortic bifurcation.    Will require follow up imaging     PAD (peripheral artery disease) ([facility])  Assessment & Plan  CTA showed - Severe vessel disease or occlusion at the V2 segment of the  proximal left vertebral artery.Severe stenosis with poststenotic dilatation at the celiac axis. 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  Refer to Vascular Surgery at discharge   Resume Asprin and Statin     Dementia ([facility])  Assessment & Plan  Continue Namenda, Celexa and Depakote  Now with delirium on top of dementia  Appreciate psychiatry recommendations    DM (diabetes mellitus) ([facility])  Assessment & Plan  Lab Results   Component Value Date    HGBA1C 7.0 (H) 07/22/2024       Recent Labs     07/23/24  1650 07/23/24  2015 07/24/24  0716 07/24/24  1116   POCGLU 175* 136 119 211*         Blood Sugar Average: Last 72 hrs:  (P) [id number]    Resume SSI regimen     HTN (hypertension)  Assessment & Plan  Resume Norvasc and Metoprolol     Hyponatremia  Assessment & Plan  On Salt tablet 1 g p.o. daily outpatient  Recent Labs     07/21/24  1906 07/22/24  0351 07/23/24  0520   SODIUM 135 134* 132*                        Hospital Course:     84-year-old male patient presenting with acute MI with elevated troponin, wall motion abnormality on echo and abnormal stress test.  Patient with history of dementia worsened in the setting of hospitalization delirium.  Patient is being medically managed for his MI    Assessment:      Principal Problem:    NSTEMI (non-ST elevated myocardial infarction) ([facility])  Active Problems:    Hyponatremia    HTN (hypertension)    DM (diabetes mellitus) ([facility])    Dementia ([facility])    PAD (peripheral artery disease) ([facility])    Abnormal CT scan    Acute hypoxemic respiratory failure ([facility])      Plan:    Continue isosorbide Plavix, aspirin       VTE Pharmacologic Prophylaxis:   Pharmacologic: Heparin  Mechanical VTE Prophylaxis in Place: Yes    AM-PAC Basic Mobility:  Basic Mobility Inpatient Raw Score:  17    -HLM Achieved: 2: Bed activities/Dependent transfer  -HLM Goal: 5: Stand one or more mins    HLM Goal listed above. Continue with multidisciplinary rounding and encourage appropriate mobility to improve upon HLM goals.         Patient Centered Rounds: Case discussed and reviewed with nursing    Discussions with Specialists or Other Care Team Provider: Case management    Education and Discussions with Family / Patient: Patient brother    Time Spent for Care: 80 minutes.  More than 50% of total time spent on counseling and coordination of care as described above.    Current Length of Stay: 3 day(s)    Current Patient Status: Inpatient   Certification Statement: The patient will continue to require additional inpatient hospital stay due to medical management of myocardial infarction    Discharge Plan / Estimated Discharge Date: To be determined will likely 24 to 48 hours    Code Status: Level 1 - Full Code      Subjective:   Dammann, history limited by delirium.  Does report intermittent chest discomfort although improved.  No nausea no vomiting    A complete and comprehensive 14 point organ system review has been performed and all other systems are negative other than stated above.    Objective:     Vitals:   Temp (24hrs), Av.7 °F (37.1 °C), Min:98.7 °F (37.1 °C), Max:98.7 °F (37.1 °C)    Temp:  [98.7 °F (37.1 °C)] 98.7 °F (37.1 °C)  HR:  [73-78] 78  Resp:  [17-18] 17  BP: ()/(43-58) 92/52  SpO2:  [94 %-96 %] 95 %  Body mass index is 23.7 kg/m².     Input and Output Summary (last 24 hours):       Intake/Output Summary (Last 24 hours) at 2024 1316  Last data filed at 2024 0835  Gross per 24 hour   Intake 120 ml   Output --   Net 120 ml       Physical Exam:     General: well appearing, no acute distress  HEENT: atraumatic, PERRLA, moist mucosa, normal pharynx, normal tonsils and adenoids, normal tongue, no fluid in sinuses  Neck: Trachea midline, no carotid bruit, no masses  Respiratory:  normal chest wall expansion, CTA B, no r/r/w, no rubs  Cardiovascular: RRR, no m/r/g, Normal S1 and S2  Abdomen: Soft, non-tender, non-distended, normal bowel sounds in all quadrants, no hepatosplenomegaly, no tympany  Rectal: deferred  Musculoskeletal: normal ROM in upper and lower extremities  Integumentary: warm, dry, and pink, with no rash, purpura, or petechia  Heme/Lymph: no lymphadenopathy, no bruises  Neurological: Cranial Nerves II-XII grossly intact  Psychiatric: cooperative with normal mood, affect, and cognition      Additional Data:     Labs:    Results from last 7 days   Lab Units 07/23/24  0520   WBC Thousand/uL 9.92   HEMOGLOBIN g/dL 11.4*   HEMATOCRIT % 34.7*   PLATELETS Thousands/uL 320   SEGS PCT % 68   LYMPHO PCT % 20   MONO PCT % 9   EOS PCT % 2     Results from last 7 days   Lab Units 07/23/24  0520 07/22/24  0351 07/21/24  1906   POTASSIUM mmol/L 4.1   < > 3.8   CHLORIDE mmol/L 102   < > 101   CO2 mmol/L 22   < > 19*   BUN mg/dL 21   < > 20   CREATININE mg/dL 0.68   < > 0.81   CALCIUM mg/dL 8.5   < > 9.0   ALK PHOS U/L  --   --  74   ALT U/L  --   --  9   AST U/L  --   --  12*    < > = values in this interval not displayed.     Results from last 7 days   Lab Units 07/21/24  1906   INR  1.06       * I Have Reviewed All Lab Data Listed Above.  * Additional Pertinent Lab Tests Reviewed: All Labs For Current Hospital Admission Reviewed    Imaging:    Imaging Reports Reviewed Today Include: No new imaging  Imaging Personally Reviewed by Myself Includes: No new imaging    Recent Cultures (last 7 days):           Last 24 Hours Medication List:   Current Facility-Administered Medications   Medication Dose Route Frequency Provider Last Rate    acetaminophen  650 mg Oral Q6H PRN PEPITO Caal      amLODIPine  5 mg Oral Daily PEPITO Caal      aspirin  81 mg Oral Daily PEPITO Caal      atorvastatin  80 mg Oral Daily With Dinner PEPITO Avila      clopidogrel  75 mg Oral Daily  PEPITO Avila      dextromethorphan-guaiFENesin  10 mL Oral Q4H PRN Attila Garrison, PEPITO      ezetimibe  10 mg Oral HS PEPITO Avila      famotidine  20 mg Oral HS Cuiyibuddy Slaterrik, PEPITO      insulin lispro  1-5 Units Subcutaneous TID AC Cuibell Slaterrik, CRLON      insulin lispro  1-5 Units Subcutaneous HS Cuiyibuddy Slaterrik, PEPITO      isosorbide dinitrate  10 mg Oral TID after meals Angelo Boateng MD      memantine  10 mg Oral BID Cuibell Garrison, PEPITO      nitroglycerin  0.4 mg Sublingual Q5 Min PRN PEPITO Caal      OLANZapine  1.25 mg Oral Q6H PRN Bulmaro Potts DO      Or    OLANZapine  1.3 mg Intramuscular Q6H PRN Max 3/day Bulmaro Potts DO      ondansetron  4 mg Intravenous Q6H PRN PEPITO Caal      [START ON 7/25/2024] sertraline  50 mg Oral Daily Bulmaro Potts DO      sodium chloride  1 g Oral Daily With Dinner PEPITO Caal         AM-PAC Basic Mobility:  Basic Mobility Inpatient Raw Score: 17    -HLM Achieved: 2: Bed activities/Dependent transfer  -HLM Goal: 5: Stand one or more mins    HLM Goal listed above. Continue with multidisciplinary rounding and encourage appropriate mobility to improve upon HLM goals.     Today, Patient Was Seen By: Tyshawn Pa DO    ** Please Note: This note was completed in part utilizing [employer] Dragon One [employer] software dictation.  Grammatical errors, random word insertions, spelling mistakes, and incomplete sentences may be an occasional consequence of this system secondary to software limitations, ambient noise, and hardware issues.  If you have any questions or concerns about the content, text, or information contained within the body of this dictation, please contact the provider for clarification. **

## 2024-07-24 NOTE — PLAN OF CARE
Problem: PAIN - ADULT  Goal: Verbalizes/displays adequate comfort level or baseline comfort level  Description: Interventions:  - Encourage patient to monitor pain and request assistance  - Assess pain using appropriate pain scale  - Administer analgesics based on type and severity of pain and evaluate response  - Implement non-pharmacological measures as appropriate and evaluate response  - Consider cultural and social influences on pain and pain management  - Notify physician/advanced practitioner if interventions unsuccessful or patient reports new pain  Outcome: Progressing     Problem: INFECTION - ADULT  Goal: Absence or prevention of progression during hospitalization  Description: INTERVENTIONS:  - Assess and monitor for signs and symptoms of infection  - Monitor lab/diagnostic results  - Monitor all insertion sites, i.e. indwelling lines, tubes, and drains  - [place] appropriate cooling/warming therapies per order  - Administer medications as ordered  - Instruct and encourage patient and family to use good hand hygiene technique  - Identify and instruct in appropriate isolation precautions for identified infection/condition  Outcome: Progressing     Problem: SAFETY ADULT  Goal: Patient will remain free of falls  Description: INTERVENTIONS:  - Educate patient/family on patient safety including physical limitations  - Instruct patient to call for assistance with activity   - Consult OT/PT to assist with strengthening/mobility   - Keep Call bell within reach  - Keep bed low and locked with side rails adjusted as appropriate  - Keep care items and personal belongings within reach  - Initiate and maintain comfort rounds  - Make Fall Risk Sign visible to staff  - Initiate/Maintain bed alarm  - Obtain necessary fall risk management equipment  - Apply yellow socks and bracelet for high fall risk patients  - Consider moving patient to room near nurses station  Outcome: Progressing  Goal: Maintain or return to  baseline ADL function  Description: INTERVENTIONS:  -  Assess patient's ability to carry out ADLs; assess patient's baseline for ADL function and identify physical deficits which impact ability to perform ADLs (bathing, care of mouth/teeth, toileting, grooming, dressing, etc.)  - Assess/evaluate cause of self-care deficits   - Assess range of motion  - Assess patient's mobility; develop plan if impaired  - Assess patient's need for assistive devices and provide as appropriate  - Encourage maximum independence but intervene and supervise when necessary  - Involve family in performance of ADLs  - Assess for home care needs following discharge   - Consider OT consult to assist with ADL evaluation and planning for discharge  - Provide patient education as appropriate  Outcome: Progressing  Goal: Maintains/Returns to pre admission functional level  Description: INTERVENTIONS:  - Perform AM-PAC 6 Click Basic Mobility/ Daily Activity assessment daily.  - Set and communicate daily mobility goal to care team and patient/family/caregiver.   - Collaborate with rehabilitation services on mobility goals if consulted  - Out of bed for toileting  - Record patient progress and toleration of activity level   Outcome: Progressing     Problem: DISCHARGE PLANNING  Goal: Discharge to home or other facility with appropriate resources  Description: INTERVENTIONS:  - Identify barriers to discharge w/patient and caregiver  - Arrange for needed discharge resources and transportation as appropriate  - Identify discharge learning needs (meds, wound care, etc.)  - Arrange for interpretive services to assist at discharge as needed  - Refer to Case Management Department for coordinating discharge planning if the patient needs post-hospital services based on physician/advanced practitioner order or complex needs related to functional status, cognitive ability, or social support system  Outcome: Progressing     Problem: Knowledge Deficit  Goal:  Patient/family/caregiver demonstrates understanding of disease process, treatment plan, medications, and discharge instructions  Description: Complete learning assessment and assess knowledge base.  Interventions:  - Provide teaching at level of understanding  - Provide teaching via preferred learning methods  Outcome: Progressing     Problem: Prexisting or High Potential for Compromised Skin Integrity  Goal: Skin integrity is maintained or improved  Description: INTERVENTIONS:  - Identify patients at risk for skin breakdown  - Assess and monitor skin integrity  - Assess and monitor nutrition and hydration status  - Monitor labs   - Assess for incontinence   - Turn and reposition patient  - Assist with mobility/ambulation  - Relieve pressure over bony prominences  - Avoid friction and shearing  - Provide appropriate hygiene as needed including keeping skin clean and dry  - Evaluate need for skin moisturizer/barrier cream  - Collaborate with interdisciplinary team   - Patient/family teaching  - Consider wound care consult   Outcome: Progressing

## 2024-07-25 VITALS
HEART RATE: 70 BPM | HEIGHT: 66 IN | DIASTOLIC BLOOD PRESSURE: 60 MMHG | TEMPERATURE: 97.4 F | WEIGHT: 146.83 LBS | OXYGEN SATURATION: 95 % | SYSTOLIC BLOOD PRESSURE: 95 MMHG | BODY MASS INDEX: 23.6 KG/M2 | RESPIRATION RATE: 17 BRPM

## 2024-07-25 LAB
GLUCOSE SERPL-MCNC: 137 MG/DL (ref 65–140)
GLUCOSE SERPL-MCNC: 258 MG/DL (ref 65–140)

## 2024-07-25 PROCEDURE — 82948 REAGENT STRIP/BLOOD GLUCOSE: CPT

## 2024-07-25 PROCEDURE — 99232 SBSQ HOSP IP/OBS MODERATE 35: CPT | Performed by: PSYCHIATRY & NEUROLOGY

## 2024-07-25 PROCEDURE — 99233 SBSQ HOSP IP/OBS HIGH 50: CPT | Performed by: INTERNAL MEDICINE

## 2024-07-25 PROCEDURE — 99239 HOSP IP/OBS DSCHRG MGMT >30: CPT | Performed by: INTERNAL MEDICINE

## 2024-07-25 RX ORDER — AMLODIPINE BESYLATE 5 MG/1
5 TABLET ORAL DAILY
Qty: 90 TABLET | Refills: 1 | Status: SHIPPED | OUTPATIENT
Start: 2024-07-25

## 2024-07-25 RX ORDER — FAMOTIDINE 20 MG/1
20 TABLET, FILM COATED ORAL
Qty: 90 TABLET | Refills: 0 | Status: SHIPPED | OUTPATIENT
Start: 2024-07-25

## 2024-07-25 RX ORDER — ATORVASTATIN CALCIUM 80 MG/1
80 TABLET, FILM COATED ORAL
Qty: 90 TABLET | Refills: 1 | Status: SHIPPED | OUTPATIENT
Start: 2024-07-25

## 2024-07-25 RX ORDER — NITROGLYCERIN 0.4 MG/1
0.4 TABLET SUBLINGUAL
Qty: 20 TABLET | Refills: 0 | Status: SHIPPED | OUTPATIENT
Start: 2024-07-25

## 2024-07-25 RX ORDER — CLOPIDOGREL BISULFATE 75 MG/1
75 TABLET ORAL DAILY
Qty: 90 TABLET | Refills: 1 | Status: SHIPPED | OUTPATIENT
Start: 2024-07-25

## 2024-07-25 RX ORDER — EZETIMIBE 10 MG/1
10 TABLET ORAL
Qty: 90 TABLET | Refills: 1 | Status: SHIPPED | OUTPATIENT
Start: 2024-07-25

## 2024-07-25 RX ORDER — ISOSORBIDE DINITRATE 10 MG/1
10 TABLET ORAL
Qty: 90 TABLET | Refills: 1 | Status: SHIPPED | OUTPATIENT
Start: 2024-07-25

## 2024-07-25 RX ORDER — LOSARTAN POTASSIUM 25 MG/1
25 TABLET ORAL DAILY
Qty: 30 TABLET | Refills: 0 | Status: SHIPPED | OUTPATIENT
Start: 2024-07-26

## 2024-07-25 RX ORDER — LOSARTAN POTASSIUM 25 MG/1
25 TABLET ORAL DAILY
Status: DISCONTINUED | OUTPATIENT
Start: 2024-07-25 | End: 2024-07-25 | Stop reason: HOSPADM

## 2024-07-25 RX ORDER — FAMOTIDINE 20 MG/1
20 TABLET, FILM COATED ORAL
Qty: 90 TABLET | Refills: 1 | Status: SHIPPED | OUTPATIENT
Start: 2024-07-25 | End: 2024-07-25

## 2024-07-25 RX ORDER — ASPIRIN 81 MG/1
81 TABLET, CHEWABLE ORAL DAILY
Qty: 90 TABLET | Refills: 1 | Status: SHIPPED | OUTPATIENT
Start: 2024-07-25

## 2024-07-25 RX ADMIN — SERTRALINE HYDROCHLORIDE 50 MG: 50 TABLET ORAL at 09:34

## 2024-07-25 RX ADMIN — HEPARIN SODIUM 5000 UNITS: 5000 INJECTION, SOLUTION INTRAVENOUS; SUBCUTANEOUS at 06:11

## 2024-07-25 RX ADMIN — ISOSORBIDE DINITRATE 10 MG: 10 TABLET ORAL at 09:34

## 2024-07-25 RX ADMIN — INSULIN LISPRO 2 UNITS: 100 INJECTION, SOLUTION INTRAVENOUS; SUBCUTANEOUS at 12:11

## 2024-07-25 RX ADMIN — ASPIRIN 81 MG CHEWABLE TABLET 81 MG: 81 TABLET CHEWABLE at 09:34

## 2024-07-25 RX ADMIN — MEMANTINE 10 MG: 10 TABLET ORAL at 09:34

## 2024-07-25 RX ADMIN — CLOPIDOGREL 75 MG: 75 TABLET ORAL at 09:34

## 2024-07-25 RX ADMIN — AMLODIPINE BESYLATE 5 MG: 5 TABLET ORAL at 09:34

## 2024-07-25 NOTE — CASE MANAGEMENT
Case Management Discharge Planning Note    Patient name Paul Stack  Location 4 [place] 405/4 [place] 405-* MRN [id number]  : 1940 Date 2024       Current Admission Date: 2024  Current Admission Diagnosis:NSTEMI (non-ST elevated myocardial infarction) ([facility])   Patient Active Problem List    Diagnosis Date Noted Date Diagnosed    PAD (peripheral artery disease) ([facility]) 2024     Abnormal CT scan 2024     Acute hypoxemic respiratory failure (HCC) 2024     NSTEMI (non-ST elevated myocardial infarction) ([facility]) 2024     Hyponatremia      HTN (hypertension)      DM (diabetes mellitus) ([facility])      Dementia ([facility])        LOS (days): 4  Geometric Mean LOS (GMLOS) (days):   Days to GMLOS:     OBJECTIVE:  Risk of Unplanned Readmission Score: 17.78         Current admission status: Inpatient   Preferred Pharmacy:   [facility] [address]  [facility] [address]  Phone: [phone] Fax: [phone]    Primary Care Provider: No primary care provider on file.    Primary Insurance: AARP  REP  Secondary Insurance: CIG[employer]    DISCHARGE DETAILS:    Treatment Team Recommendation: Facility Return, Assisted Living  Discharge Destination Plan:: Facility Return, Assisted Living  Transport at Discharge : [facility]  Dispatcher Contacted: Yes  Number/Name of Dispatcher: Roundtrip  Transported by (Company and Unit #): [employer]  ETA of Transport (Date): 24  ETA of Transport (Time): 1430     IMM Given (Date):: 24 (IMM reviewed via phone call with pt's brother. Pt's brother agreeable with discharge determination. IMM placed in pt's chart.)     Family notified:: CM spoke with pt's brother and informed of 2:30pm transport back to [facility].  Additional Comments: CM spoke with Sydni of [place] at [facility] ([phone]) and confirmed they received the fax of medications from this CM and informed that pt will be  returning today with a 2:30pm transport setup. Provider and nursing aware of transport time as well.

## 2024-07-25 NOTE — PROGRESS NOTES
Progress Note - Cardiology   [facility]     Paul Stack 84 y.o. male MRN: [id number]  : 1940  Unit/Bed#: [address] [place] [address] Encounter: [id number]    Assessment and Plan:   1. NSTEMI: patient presented with 6/10 chest discomfort which radiated to his back    -   hs trop: 117 (0hr), 403 (2hr), 1702 (4hr)    -   random hs trop: 4485 and 4457    -   admission EKG demonstrated ST depression of more than 3 mm in anterior lateral leads    -   patient started on IV heparin ACS protocol in the emergency room, will complete 2024 10 PM    -   not started on beta blocker due to bradycardia    -   2024 TTE: EF visibly estimated 45% with severe hypo kinesis of basal mid inferior and inferior lateral walls, there is mild to moderate aortic regurgitation and left atrium is mildly dilated    -   continue dual antiplatelet therapy with aspirin and Plavix daily    -   continue Lipitor 80 mg daily    -   on Pepcid for G.I. prophylaxis    -   2024 Lexiscan nuclear stress test: patient experience 5/10 chest pain post vasodilation. Perfusion with large inferior territory of ischemia/possible prior infarction with Elma-infarction ischemia    -   results were discussed with family, at this time they wish to pursue goal-directed medical therapy    -   Isordil 10 mg tid is on 2024     2. Hypertension: blood pressures elevated    -   continue Norvasc 5 mg daily    -   continue Cozaar 25 mg once a day    -   monitor blood pressures and titrate as needed     3. Diabetes: A1c 7 with estimated average glucose of 154    -   managed per primary team     4. Dyslipidemia: 2024 lipid panel: total cholesterol 297, triglycerides 90, HDL 39 and     -   continue Lipitor 80 mg daily    -   continue Zetia 10 mg once a day    -   monitor LFTs and lipids in the outpatient setting, outpatient labs in approximately 6 to 8 weeks     5. PAD: 24 CTA dissection protocol: severe vessel disease noted  "in left vertebral artery, celiac axis and left common femoral artery    -   continue Lipitor 80 mg daily    -   continue Plavix 75 mg once a day     6. Mild cognitive impairment: reorient as needed    -   seen by psychiatry    Patient appears to be stable from a cardiac standpoint. He is not had any further chest discomfort. RXs for new cardiac medication printed and given to case management. May discharge when cleared by primary team.    Subjective / Objective:   Vitals: Blood pressure 147/67, pulse 70, temperature (!) 97.4 °F (36.3 °C), temperature source Oral, resp. rate 17, height 5' 6\" (1.676 m), weight 66.6 kg (146 lb 13.2 oz), SpO2 95%.  Vitals:    07/21/24 2242 07/22/24 0708   Weight: 66.6 kg (146 lb 12.8 oz) 66.6 kg (146 lb 13.2 oz)     Body mass index is 23.7 kg/m².  BP Readings from Last 3 Encounters:   07/25/24 147/67     Orthostatic Blood Pressures      Flowsheet Row Most Recent Value   Blood Pressure 147/67 filed at 07/25/2024 0817   Patient Position - Orthostatic VS Lying filed at 07/25/2024 0817          I/O         07/23 0701  07/24 0700 07/24 0701  07/25 0700 07/25 0701  07/26 0700    P.O.  120     Total Intake(mL/kg)  120 (1.8)     Urine (mL/kg/hr)       Total Output       Net  +120                  Invasive Devices       Peripheral Intravenous Line  Duration             Peripheral IV 07/21/24 Left Antecubital 3 days                    No intake or output data in the 24 hours ending 07/25/24 0856      Physical Exam:   Physical Exam  Vitals and nursing note reviewed.   Constitutional:       General: He is not in acute distress.     Appearance: Normal appearance. He is normal weight.   HENT:      Right Ear: External ear normal.      Left Ear: External ear normal.      Nose: Nose normal.   Eyes:      General: No scleral icterus.        Right eye: No discharge.         Left eye: No discharge.   Cardiovascular:      Rate and Rhythm: Normal rate and regular rhythm.      Pulses: Normal pulses.      Heart " sounds: No murmur heard.  Pulmonary:      Effort: Pulmonary effort is normal.      Breath sounds: Normal breath sounds.   Abdominal:      General: Bowel sounds are normal. There is no distension.      Palpations: Abdomen is soft.   Musculoskeletal:      Right lower leg: No edema.      Left lower leg: No edema.   Skin:     General: Skin is warm and dry.      Capillary Refill: Capillary refill takes less than 2 seconds.   Neurological:      General: No focal deficit present.      Mental Status: He is alert and oriented to person, place, and time. Mental status is at baseline.   Psychiatric:         Mood and Affect: Mood normal.              Medications/ Allergies:     Current Facility-Administered Medications   Medication Dose Route Frequency Provider Last Rate    acetaminophen  650 mg Oral Q6H PRN Cuiyin Yurik, CRNP      amLODIPine  5 mg Oral Daily Cuibell Slaterrik, CRNP      aspirin  81 mg Oral Daily Cuibell Slaterrik, CRNP      atorvastatin  80 mg Oral Daily With Dinner Radha Giles, CRNP      clopidogrel  75 mg Oral Daily Radha Giles, CRNP      dextromethorphan-guaiFENesin  10 mL Oral Q4H PRN Cuibell Slaterrik, CRNP      ezetimibe  10 mg Oral HS Radha Chan, CRNP      famotidine  20 mg Oral HS Cuiyin Yurik, CRNP      heparin (porcine)  5,000 Units Subcutaneous Q8H [facility]      insulin lispro  1-5 Units Subcutaneous TID AC Cuiyin Yurik, CRNP      insulin lispro  1-5 Units Subcutaneous HS Cuibell Slaterrik, CRNP      isosorbide dinitrate  10 mg Oral TID after meals Angelo Boateng MD      memantine  10 mg Oral BID Cuibell Garrison, CRNP      nitroglycerin  0.4 mg Sublingual Q5 Min PRN Attial Garrison, CRNP      OLANZapine  1.25 mg Oral Q6H PRN Bulmaro Potts DO      Or    OLANZapine  1.3 mg Intramuscular Q6H PRN Max 3/day Bulmaro Potts DO      ondansetron  4 mg Intravenous Q6H PRN Attila Garrison, MANDEEPNP      sertraline  50 mg Oral Daily Bulmaro Potts DO      sodium chloride  1 g Oral Daily With Dinner Attila Garrison,  CRNP       acetaminophen, 650 mg, Q6H PRN  dextromethorphan-guaiFENesin, 10 mL, Q4H PRN  nitroglycerin, 0.4 mg, Q5 Min PRN  OLANZapine, 1.25 mg, Q6H PRN   Or  OLANZapine, 1.3 mg, Q6H PRN Max 3/day  ondansetron, 4 mg, Q6H PRN      No Known Allergies    VTE Pharmacologic Prophylaxis:   Sequential compression device (Venodyne)     Labs:   Troponins:  Results from last 7 days   Lab Units 07/22/24  1158 07/22/24  0352 07/21/24  2331 07/21/24  2109   HS TNI RAND ng/L 4,857* 4,485*  --   --    HSTNI D2 ng/L  --   --   --  286*   HSTNI D4 ng/L  --   --  1,585*  --      CBC with diff:  Results from last 7 days   Lab Units 07/23/24  0520 07/22/24  0351 07/21/24  1906   WBC Thousand/uL 9.92 10.94* 10.56*   HEMOGLOBIN g/dL 11.4* 12.1 12.0   HEMATOCRIT % 34.7* 36.2* 37.0   MCV fL 94 93 94   PLATELETS Thousands/uL 320 337 349   RBC Million/uL 3.68* 3.90 3.93   MCH pg 31.0 31.0 30.5   MCHC g/dL 32.9 33.4 32.4   RDW % 13.8 13.5 13.7   MPV fL 9.6 9.7 9.5   NRBC AUTO /100 WBCs 0 0 0     CMP:  Results from last 7 days   Lab Units 07/23/24  0520 07/22/24  0351 07/21/24  1906   SODIUM mmol/L 132* 134* 135   POTASSIUM mmol/L 4.1 4.3 3.8   CHLORIDE mmol/L 102 101 101   CO2 mmol/L 22 24 19*   ANION GAP mmol/L 8 9 15*   BUN mg/dL 21 19 20   CREATININE mg/dL 0.68 0.76 0.81   CALCIUM mg/dL 8.5 8.5 9.0   AST U/L  --   --  12*   ALT U/L  --   --  9   ALK PHOS U/L  --   --  74   TOTAL PROTEIN g/dL  --   --  6.8   ALBUMIN g/dL  --   --  3.9   TOTAL BILIRUBIN mg/dL  --   --  0.35   EGFR ml/min/1.73sq m 86 82 80     Magnesium:  Results from last 7 days   Lab Units 07/23/24  0520 07/22/24  0351 07/21/24  1906   MAGNESIUM mg/dL 2.0 2.2 1.6*     Coags:  Results from last 7 days   Lab Units 07/23/24  0520 07/23/24  0156 07/22/24  1926 07/22/24  1158 07/22/24  0352 07/21/24  1906   PTT seconds 61* 63* 69* 105* 174* 24   INR   --   --   --   --   --  1.06     TSH:  Results from last 7 days   Lab Units 07/22/24  0351   TSH 3RD GENERATON uIU/mL 1.888      Lipid Profile:  Results from last 7 days   Lab Units 07/22/24  0351   CHOLESTEROL mg/dL 297*   TRIGLYCERIDES mg/dL 90   HDL mg/dL 39*   LDL CALC mg/dL 240*     Hgb A1c:  Results from last 7 days   Lab Units 07/22/24  1158   HEMOGLOBIN A1C % 7.0*       Imaging & Testing   I have personally reviewed pertinent reports.    Stress strip    Result Date: 7/23/2024  Narrative: Confirmed by AUSTIN SWEENEY ([id number]),  Jen Hodges ([id number]) on 7/23/2024 2:57:19 PM    NM Myocardial Perfusion Spect (Pharmacological Induced Stress and/or Rest)    Result Date: 7/23/2024  Narrative:   Stress ECG: The stress ECG is equivocal for ischemia after pharmacologic vasodilation, with reproduction of symptoms.   Stress ECG: The patient after exercising for 1 min and had a maximal HR of 81 bpm (60% of MPHR) and 1.0 METS. The patient experienced no angina during the test. The patient reported chest pain (atypical) during the stress test. Symptoms began at minute 1 post lexiscan infusion and ended at minute 5 during recovery.   Perfusion: There is a left ventricular perfusion defect that is medium to large in size present in the entire inferior and inferoseptal location(s) that is predominantly fixed. There is a left ventricular perfusion defect that is medium in size present in the mid to basal inferolateral location(s) that is predominantly reversible.   Stress Combined Conclusion: Left ventricular perfusion is abnormal.   Stress Function: Stress ejection fraction is 36%.   Perfusion Defect Conclusion: The stress/rest perfusion ratio is 1.12. Abnormal study. Patient with 5/10 chest pain post vasodilation. SSS 14 SRS 9 SDS 5. Large inferior territory of ischemia/possible prior infarction with ben-infarct ischemia.     XR chest portable    Result Date: 7/22/2024  Narrative: XR CHEST PORTABLE INDICATION: F/U on acute SOB episode after CT scan in ED. COMPARISON: 7/21/2024. FINDINGS: No focal airspace consolidation, pleural effusion,  signs of congestion, or pneumothorax. Cardiomediastinal silhouette is unremarkable. Osseous structures are grossly intact.     Impression: No acute cardiopulmonary disease. Workstation performed: [id number]     Echo complete w/ contrast if indicated    Result Date: 7/22/2024  Narrative:   Left Ventricle: Left ventricular cavity size is normal. Wall thickness is mildly increased. The left ventricular ejection fraction is 45% by visual estimation. Systolic function is mildly reduced. Severe hypokinesis of basal-mid inferior and inferolaterla walls Diastolic function is abnormal.   Right Ventricle: Systolic function is normal. Normal tricuspid annular plane systolic excursion (TAPSE) > 1.7 cm.   Left Atrium: The atrium is mildly dilated.   Aortic Valve: There is mild to moderate regurgitation.   Mitral Valve: There is mild regurgitation.     XR chest 1 view portable    Result Date: 7/22/2024  Narrative: XR CHEST PORTABLE INDICATION: chest pain. COMPARISON: None FINDINGS: Clear lungs. No pneumothorax or pleural effusion. Normal cardiomediastinal silhouette. Bones are unremarkable for age. Normal upper abdomen.     Impression: No acute cardiopulmonary disease. Workstation performed: [id number]     CTA dissection protocol chest/abdomen/pelvis    Result Date: 7/21/2024  Narrative: CTA - CHEST, ABDOMEN AND PELVIS - WITHOUT AND WITH IV CONTRAST INDICATION: chest pain radiating to back. COMPARISON: None. TECHNIQUE: CT examination of the chest, abdomen and pelvis was performed both prior to and after the administration of intravenous contrast. The noncontrast portion of this examination was performed utilizing low radiation dose technique.  Thin section angiographic arterial phase post contrast technique was used in order to evaluate for aortic dissection. 3D reformatted images and volume rendering were performed on an independent workstation. Multiplanar 2D reformatted images were created from the source data. Radiation  dose length product (DLP) for this visit: 974 mGy-cm . This examination, like all CT scans performed in the [facility], was performed utilizing techniques to minimize radiation dose exposure, including the use of iterative reconstruction and automated exposure control. IV Contrast: 100 mL of iohexol (OMNIPAQUE) Enteric Contrast: Not administered. FINDINGS: AORTA: No aortic dissection or intramural hematoma. Infrarenal abdominal aortic aneurysm measuring up to 3.3 cm just proximal to the aortic bifurcation. 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 Severe vessel disease or occlusion at the V2 segment of the proximal left vertebral artery Severe stenosis with poststenotic dilatation at the celiac axis. At least moderate ostial stenosis of the takeoff of the left renal artery. 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 CHEST LUNGS: Lungs are clear. No tracheal or endobronchial lesion. PLEURA: Unremarkable. HEART/PULMONARY ARTERIAL TREE: Heavy atherosclerotic coronary artery calcification. MEDIASTINUM AND MIKAELA: Unremarkable. CHEST WALL AND LOWER NECK: Unremarkable. ABDOMEN LIVER/BILIARY TREE: Unremarkable. GALLBLADDER: No calcified gallstones. No pericholecystic inflammatory change. SPLEEN: Unremarkable. PANCREAS: Unremarkable. ADRENAL GLANDS: Unremarkable. KIDNEYS/URETERS: Simple renal cyst(s). Otherwise unremarkable kidneys. No hydronephrosis. STOMACH AND BOWEL: Colonic diverticulosis without findings of acute diverticulitis. APPENDIX: No findings to suggest appendicitis. ABDOMINOPELVIC CAVITY: No ascites. No pneumoperitoneum. No lymphadenopathy. PELVIS REPRODUCTIVE ORGANS: Unremarkable for patient's age. URINARY BLADDER: Unremarkable. ABDOMINAL WALL/INGUINAL REGIONS: Unremarkable. BONES: No acute fracture or suspicious osseous lesion.  "    Impression: No evidence of aortic dissection. Infrarenal abdominal aortic aneurysm measuring up to 3.3 cm just proximal to the aortic bifurcation Severe vessel disease as detailed above involving the left vertebral artery, celiac axis and left common femoral artery. Workstation performed: [id number]        EKG / Monitor: Personally reviewed.    Sinus rhythm        Radha BEYER  Cardiology      \"This note was completed in part utilizing [employer] direct voice recognition software.   Grammatical errors, random word insertion, spelling mistakes, and incomplete sentences may be an occasional consequence of the system secondary to software limitations, ambient noise and hardware issues.    Please read the chart carefully and recognize, using context, where substitutions have occurred.  If you have any questions or concerns about the context, text or information contained within the body of this dictation, please contact myself, the provider, for further clarification.\"  "

## 2024-07-25 NOTE — UTILIZATION REVIEW
NOTIFICATION OF ADMISSION DISCHARGE   This is a Notification of Discharge from [facility]. Please be advised that this patient has been discharge from our facility. Below you will find the admission and discharge date and time including the patient’s disposition.   UTILIZATION REVIEW CONTACT:  Haylee Jensen  Utilization   Network Utilization Review Department  Phone: [phone] x carefully listen to the prompts. All voicemails are confidential.  Email: [email or web address]     ADMISSION INFORMATION  PRESENTATION DATE: 7/21/2024  6:58 PM  OBERVATION ADMISSION DATE: N/A  INPATIENT ADMISSION DATE: 7/21/24 10:04 PM   DISCHARGE DATE: 7/25/2024  2:39 PM   DISPOSITION:Non [facility] SNF/TCU/SNU    Network Utilization Review Department  ATTENTION: Please call with any questions or concerns to [phone] and carefully listen to the prompts so that you are directed to the right person. All voicemails are confidential.   For Discharge needs, contact Care Management DC Support Team at [phone] opt. 2  Send all requests for admission clinical reviews, approved or denied determinations and any other requests to dedicated fax number below belonging to the campus where the patient is receiving treatment. List of dedicated fax numbers for the Facilities:  FACILITY NAME UR FAX NUMBER   ADMISSION DENIALS (Administrative/Medical Necessity) [phone]   DISCHARGE SUPPORT TEAM ([facility]) [phone]   PARENT CHILD HEALTH (Maternity/NICU/Pediatrics) [phone]   [facility] [phone]   [facility] [phone]   [facility] [phone]   [facility] [phone]   [facility] [phone]   [facility] [phone]   [facility] [phone]   [facility]  [phone]   [facility] [phone]   [facility] [phone]   [facility] [phone]   [facility] [phone]

## 2024-07-25 NOTE — PLAN OF CARE
Problem: PAIN - ADULT  Goal: Verbalizes/displays adequate comfort level or baseline comfort level  Description: Interventions:  - Encourage patient to monitor pain and request assistance  - Assess pain using appropriate pain scale  - Administer analgesics based on type and severity of pain and evaluate response  - Implement non-pharmacological measures as appropriate and evaluate response  - Consider cultural and social influences on pain and pain management  - Notify physician/advanced practitioner if interventions unsuccessful or patient reports new pain  Outcome: Progressing     Problem: INFECTION - ADULT  Goal: Absence or prevention of progression during hospitalization  Description: INTERVENTIONS:  - Assess and monitor for signs and symptoms of infection  - Monitor lab/diagnostic results  - Monitor all insertion sites, i.e. indwelling lines, tubes, and drains  - Lawrence appropriate cooling/warming therapies per order  - Administer medications as ordered  - Instruct and encourage patient and family to use good hand hygiene technique  - Identify and instruct in appropriate isolation precautions for identified infection/condition  Outcome: Progressing     Problem: SAFETY ADULT  Goal: Patient will remain free of falls  Description: INTERVENTIONS:  - Educate patient/family on patient safety including physical limitations  - Instruct patient to call for assistance with activity   - Consult OT/PT to assist with strengthening/mobility   - Keep Call bell within reach  - Keep bed low and locked with side rails adjusted as appropriate  - Keep care items and personal belongings within reach  - Initiate and maintain comfort rounds  - Make Fall Risk Sign visible to staff  - Initiate/Maintain bed alarm  - Obtain necessary fall risk management equipment  - Apply yellow socks and bracelet for high fall risk patients  - Consider moving patient to room near nurses station  Outcome: Progressing  Goal: Maintain or return to  baseline ADL function  Description: INTERVENTIONS:  -  Assess patient's ability to carry out ADLs; assess patient's baseline for ADL function and identify physical deficits which impact ability to perform ADLs (bathing, care of mouth/teeth, toileting, grooming, dressing, etc.)  - Assess/evaluate cause of self-care deficits   - Assess range of motion  - Assess patient's mobility; develop plan if impaired  - Assess patient's need for assistive devices and provide as appropriate  - Encourage maximum independence but intervene and supervise when necessary  - Involve family in performance of ADLs  - Assess for home care needs following discharge   - Consider OT consult to assist with ADL evaluation and planning for discharge  - Provide patient education as appropriate  Outcome: Progressing  Goal: Maintains/Returns to pre admission functional level  Description: INTERVENTIONS:  - Perform AM-PAC 6 Click Basic Mobility/ Daily Activity assessment daily.  - Set and communicate daily mobility goal to care team and patient/family/caregiver.   - Collaborate with rehabilitation services on mobility goals if consulted  - Out of bed for toileting  - Record patient progress and toleration of activity level   Outcome: Progressing     Problem: DISCHARGE PLANNING  Goal: Discharge to home or other facility with appropriate resources  Description: INTERVENTIONS:  - Identify barriers to discharge w/patient and caregiver  - Arrange for needed discharge resources and transportation as appropriate  - Identify discharge learning needs (meds, wound care, etc.)  - Arrange for interpretive services to assist at discharge as needed  - Refer to Case Management Department for coordinating discharge planning if the patient needs post-hospital services based on physician/advanced practitioner order or complex needs related to functional status, cognitive ability, or social support system  Outcome: Progressing     Problem: Knowledge Deficit  Goal:  Patient/family/caregiver demonstrates understanding of disease process, treatment plan, medications, and discharge instructions  Description: Complete learning assessment and assess knowledge base.  Interventions:  - Provide teaching at level of understanding  - Provide teaching via preferred learning methods  Outcome: Progressing     Problem: Prexisting or High Potential for Compromised Skin Integrity  Goal: Skin integrity is maintained or improved  Description: INTERVENTIONS:  - Identify patients at risk for skin breakdown  - Assess and monitor skin integrity  - Assess and monitor nutrition and hydration status  - Monitor labs   - Assess for incontinence   - Turn and reposition patient  - Assist with mobility/ambulation  - Relieve pressure over bony prominences  - Avoid friction and shearing  - Provide appropriate hygiene as needed including keeping skin clean and dry  - Evaluate need for skin moisturizer/barrier cream  - Collaborate with interdisciplinary team   - Patient/family teaching  - Consider wound care consult   Outcome: Progressing

## 2024-07-25 NOTE — [place] UNIVERSAL TRANSFER FORM
"[place] UNIVERSAL TRANSFER FORM  (ALL ITEMS MUST BE COMPLETED)    1. TRANSFER FROM: [facility]      TRANSFER TO: [facility]     2. DATE OF TRANSFER: 7/25/2024                        TIME OF TRANSFER: 1430    3. PATIENT NAME: Paul Stack,        YOB: 1940                             GENDER: male    4. LANGUAGE:   English    5. PHYSICIAN NAME:  Patricia Easley MD                   PHONE: [phone]    6. CODE STATUS: Level 1 - Full Code        Out of Hospital DNR Attached: No    7. :                                      :  Extended Emergency Contact Information  Primary Emergency Contact: Sanjay Stack  Mobile Phone: [phone]  Relation: Brother  Secondary Emergency Contact: SumanthVikram  Mobile Phone: [phone]  Relation: Sister           Health Care Representative/Proxy:  No           Legal Guardian:  No             NAME OF:           HEALTH CARE REPRESENTATIVE/PROXY:                                         OR           LEGAL GUARDIAN, IF NOT :                                               PHONE:  (Day)           (Night)                        (Cell)    8. REASON FOR TRANSFER: (Must include brief medical history and recent changes in physical function or cognition.) NSTEMI            V/S: BP 95/60 (BP Location: Right arm)   Pulse 70   Temp (!) 97.4 °F (36.3 °C) (Oral)   Resp 17   Ht 5' 6\" (1.676 m)   Wt 66.6 kg (146 lb 13.2 oz)   SpO2 95%   BMI 23.70 kg/m²           PAIN: None    9. PRIMARY DIAGNOSIS: NSTEMI (non-ST elevated myocardial infarction) (HCC)      Secondary Diagnosis:         Pacemaker: Yes      Internal Defib: Yes          Mental Health Diagnosis (if Applicable):    10. RESTRAINTS: No     11. RESPIRATORY NEEDS: None    12. ISOLATION/PRECAUTION: None    13. ALLERGY: Patient has no known allergies.    14. SENSORY:       N/a    15. SKIN CONDITION: No Wounds    16. DIET: Regular    17. IV " ACCESS: None    18. PERSONAL ITEMS SENT WITH PATIENT: None    19. ATTACHED DOCUMENTS: MUST ATTACH CURRENT MEDICATION INFORMATION Face Sheet, MAR, and Discharge Summary    20. AT RISK ALERTS:[place]        HARM TO: N/A    21. WEIGHT BEARING STATUS:         Left Leg: Full        Right Leg: Full    22. MENTAL STATUS:Disoriented    23. FUNCTION:        Walk: Self        Transfer: Self        Toilet: Self        Feed: Self    24. IMMUNIZATIONS/SCREENING:     There is no immunization history on file for this patient.    25. BOWEL: Continent    26. BLADDER: Continent    27.        FORM COMPLETED BY JENNIFER RN / [phone]

## 2024-07-25 NOTE — CASE MANAGEMENT
Case Management Discharge Planning Note    Patient name Paul Stack  Location [address] [address]/[address] [place] [address]-* MRN [id number]  : 1940 Date 2024       Current Admission Date: 2024  Current Admission Diagnosis:NSTEMI (non-ST elevated myocardial infarction) ([facility])   Patient Active Problem List    Diagnosis Date Noted Date Diagnosed    PAD (peripheral artery disease) ([facility]) 2024     Abnormal CT scan 2024     Acute hypoxemic respiratory failure ([facility]) 2024     NSTEMI (non-ST elevated myocardial infarction) ([facility]) 2024     Hyponatremia      HTN (hypertension)      DM (diabetes mellitus) ([facility])      Dementia ([facility])        LOS (days): 4  Geometric Mean LOS (GMLOS) (days):   Days to GMLOS:     OBJECTIVE:  Risk of Unplanned Readmission Score: 16.15         Current admission status: Inpatient   Preferred Pharmacy:   [facility] [address] [place], [place] [address] [facility] [address]  Phone: [phone] Fax: [phone]    Primary Care Provider: No primary care provider on file.    Primary Insurance: [employer] ROYAL REP  Secondary Insurance: [employer]    DISCHARGE DETAILS:  Additional Comments: DORETHA faxed new cardiac medication scripts to Micha Correia at [phone].

## 2024-07-25 NOTE — NURSING NOTE
Report given to receiving facility. Questions regarding changes in psych meds were asked and attending and  made aware so correct scripts sent. EPIC and SmartBIM were not working in sync at time of discharge so discharge paperwork sent via fax ([phone])

## 2024-07-25 NOTE — PROGRESS NOTES
"Progress Note - Behavioral Health   Paul Stack 84 y.o. male MRN: [id number]  Unit/Bed#: [address] [place] [address] Encounter: [id number]          I came to see the patient for continuity of care.  Patient continues to appear to be pleasantly confused.  He was calm and cooperative.  He continued to appear to be making jokes and laughing at times.  He states that he does not know where he is.  When asked how his mood is patient states \"I haven't checked\". States that he has been here about \"a week or 2\" because he made a \"mistake\".  He was perseverative and repeatedly discussed \"industrial\" area and pointed outside the window.  He was discussing different towns in [place].  He was unable to state the year. Did not appear to understand questioning regarding auditory or visual hallucinations though denies. Denies suicidal or homicidal ideation.  Denies experiencing chest pain, upset stomach, diarrhea.  States that he has \"no complaints\".      Mental Status Evaluation:  Appearance:  appears stated age, bearded, and sitting upright in chair   Behavior:  calm, cooperative, and pleasant   Speech:  normal rate and normal volume   Mood:  \"I haven't checked\"   Affect:  Brightens and laughing at times   Language: Unable to fully assess   Thought Process:  perserverative   Associations: perseverative   Thought Content:  no delusions elicited   Perceptual Disturbances: Did not appear to understand questioning regarding auditory or visual hallucinations though denies   Risk Potential: Denies suicidal or homicidal ideation   Sensorium:  person   Memory:  Appears impaired   Cognition:  Appears impaired   Consciousness:  alert and awake    Attention: Decreased   Intellect: Not assessed   Fund of Knowledge: Not assessed   Insight:  poor   Judgment: moderate   Muscle Strength and Tone: Not assessed   Gait/Station: Not assessed   Motor Activity: no abnormal movements         Assessment/Plan  Paul Stack is a 84 y.o. male with past " "medical history of dementia, type II diabetes, hypertension, peripheral artery disease who presented to the ED for chest pain and is admitted for NSTEMI.  Patient was seen for psychiatric consultation 7/24/2024 for delirium.  Patient's citalopram was discontinued and sertraline 50 mg daily was started, Depakote was discontinued -please see note for more information. Per chart patient has not received Zyprexa as needed.    Patient remains calm, cooperative, pleasantly confused, appears to be making jokes and laughing at times.  Patient is oriented only to person. When asked how his mood is patient states \"I haven't checked\". States that he has been here about \"a week or 2\" because he made a \"mistake\". He was perseverative and repeatedly discussed \"industrial\" area and pointed outside the window. Did not appear to understand questioning regarding auditory or visual hallucinations though denies.  Denies suicidal or homicidal ideation.  Continue to suspect patient is experienced delirium secondary to his medical conditions including his NSTEMI and respiratory failure which is superimposed on dementia.  Patient did not exhibit overt agitated or aggressive behaviors during the interview.  Patient denies experiencing pain, upset stomach, diarrhea.  He appears to be tolerating medication changes that were made yesterday.  There is no clear indication for inpatient psychiatric hospitalization at this time    Diagnosis:  Delirium  Dementia with behavioral disturbance    Recommended Treatment:   Continue medical management  There is no clear indication for inpatient psychiatric hospitalization at this time  Continue sertraline 50 mg daily  Continue Zyprexa 1.25 mg every 6 hours as needed agitation with Zyprexa 1.3 mg IM alternative if oral route is not available  Do not administer IM/IV benzodiazepines if possible within 1 hour of administration of IM Zyprexa as this is associated with excessive sedation and cardiorespiratory " suppression  Avoid utilizing benzodiazepines if possible as their use is associated with worsening of delirium as well as increased risk of adverse effects in patients with dementia and are not recommended in the treatment of neuropsychiatric symptoms of dementia  Maintain delirium precautions:  Encourage mobility, family involvement, frequent reorientation, and cognitive stimulation.  If patient uses assistive devices (eye glasses, hearing aids), please make sure that they are working properly.  Continue to encourage adequate hydration, nutrition and monitor bowel movements.  Maintain sleep-wake cycle.  Maintain optimal pain control  Discussed with the primary team  Psychiatry will follow up as necessary.  Please contact with questions.  Follow-up with facility/PCP after discharge for management of psychiatric medications  For after hours and weekends please contact [facility] for psychiatric purposes        Medications: all current active meds have been reviewed and continue current psychiatric medications      Risks, benefits and possible side effects of Medications:     Unable to have discussion due to patient factors      Labs: I have personally reviewed all pertinent laboratory results.     I have personally reviewed all pertinent laboratory/tests results.  Most Recent Labs:   Lab Results   Component Value Date    WBC 9.92 07/23/2024    RBC 3.68 (L) 07/23/2024    HGB 11.4 (L) 07/23/2024    HCT 34.7 (L) 07/23/2024     07/23/2024    RDW 13.8 07/23/2024    NEUTROABS 6.70 07/23/2024    SODIUM 132 (L) 07/23/2024    K 4.1 07/23/2024     07/23/2024    CO2 22 07/23/2024    BUN 21 07/23/2024    CREATININE 0.68 07/23/2024    GLUC 119 07/23/2024    CALCIUM 8.5 07/23/2024    AST 12 (L) 07/21/2024    ALT 9 07/21/2024    ALKPHOS 74 07/21/2024    TP 6.8 07/21/2024    ALB 3.9 07/21/2024    TBILI 0.35 07/21/2024    CHOLESTEROL 297 (H) 07/22/2024    HDL 39 (L) 07/22/2024    TRIG 90 07/22/2024    LDLCALC 240 (H)  07/22/2024    EXL8QEFURLDB 1.888 07/22/2024    HGBA1C 7.0 (H) 07/22/2024     07/22/2024     EKG   Lab Results   Component Value Date    VENTRATE 65 07/21/2024    ATRIALRATE 65 07/21/2024    PRINT (220 - Age)*100% 07/23/2024    PRINT non-limiting 07/23/2024    PRINT 68 07/23/2024    PRINT 134 07/23/2024    PRINT Protocol Complete 07/23/2024    PRINT Chest Discomfort 07/23/2024    QRSDINT 92 07/21/2024    QTINT 450 07/21/2024    QTCINT 468 07/21/2024    PAXIS 29 07/21/2024    QRSAXIS 59 07/21/2024    TWAVEAXIS -74 07/21/2024         Bulmaro Potts DO  07/25/24      This note has been constructed using a voice recognition system.    There may be translation, syntax,  or grammatical errors. If you have any questions, please contact the dictating provider.

## 2024-07-26 NOTE — DISCHARGE SUMMARY
Discharge Summary - [facility] Internal Medicine  Patient: Paul Stack 84 y.o. male   MRN: [id number]  PCP: No primary care provider on file.  Unit/Bed#: [address] [place] [address] Encounter: [id number]            Discharging Physician / Practitioner: Patricia Easley MD  PCP: No primary care provider on file.  Admission Date:   Admission Orders (From admission, onward)       Ordered        07/21/24 2204  INPATIENT ADMISSION  Once                          Discharge Date: 07/25/24      Reason for Admission:  Chest discomfort and shortness of breath       Discharge Diagnoses:     Principal Problem:    NSTEMI (non-ST elevated myocardial infarction) ([facility])    Active Problems:    Hyponatremia    HTN (hypertension)    DM (diabetes mellitus) (HCC)    Dementia (HCC)    PAD (peripheral artery disease) ([facility])    Abnormal CT scan     Resolved Problems:     Acute hypoxemic respiratory failure ([facility])      Consultations During Hospital Stay:  Cardiology  Psychiatry      Hospital Course:     Paul tSack is a 84 y.o. male patient who originally presented to the hospital on 7/21/2024 due to complaints of chest pain with intermittent shortness of breath at his assisted living facility.  EKG noted concerning changes with progression of elevated troponins, and patient was diagnosed with a NSTEMI.  Due to his age and comorbidities, per cardiology, he was conservatively treated with optimization of medical management including dual antiplatelet therapy, with a statin, and afterload reduction with Isordil/Cozaar.  Echocardiogram revealed an EF of 45% with multi regional LV hypokinesis.  Of note, he has an underlying history of diabetes mellitus treated with sliding scale insulin during hospital course.  Other issues include hypertension, hyperlipidemia, and peripheral arterial disease.  Please overall prognosis is complicated by underlying dementia, for which he is on a course of Namenda and Zoloft, and he was seen by psychiatry during  "hospital course.  His hyponatremia remained relatively stable with a serum sodium 132 on day of discharge, treated with salt tablet supplementation and fluid restriction.  He will be discharged today back to his facility with home health services.      Condition at Discharge: fair       Discharge Day Visit / Exam:     Vitals:  Blood Pressure: 95/60 (07/25/24 1245)  Pulse: 70 (07/25/24 0817)  Temperature: (!) 97.4 °F (36.3 °C) (07/25/24 0817)  Temp Source: Oral (07/25/24 0817)  Respirations: 17 (07/25/24 0817)  Height: 5' 6\" (167.6 cm) (07/22/24 0708)  Weight - Scale: 66.6 kg (146 lb 13.2 oz) (07/22/24 0708)  SpO2: 95 % (07/24/24 0908)      Physical exam:  I had a face-to-face encounter with the patient on day of discharge.      Discussion with Patient and/or Family:  The patient has been advised to return to the ER immediately if any symptoms recur or worsen.       Discharge instructions/Information to Patient and/or Family:   See after visit summary for information provided to patient and/or family.        Provisions for Follow-Up Care:  See after visit summary for information related to follow-up care and any pertinent home health orders.        Disposition:   Home with home health services      Discharge Medications:  See after visit summary for reconciled discharge medications provided to patient and/or family.        Discharge Statement:  I spent 38 minutes discharging the patient. This time was spent on the day of discharge. I had direct contact with the patient on the day of discharge. Greater than 50% of the total time was spent examining patient, answering all patient questions, arranging and discussing plan of care with patient as well as directly providing post-discharge instructions.  Additional time then spent on discharge activities.           SHARON BURROUGHS MD   Hospitalist - [facility] Internal Medicine        ** Please Note: This note is constructed using a voice recognition dictation system.  An " occasional wrong word/phrase or “sound-a-like” substitution may have been picked up by dictation device due to the inherent limitations of voice recognition software.  Read the chart carefully and recognize, using reasonable context, where substitutions may have occurred.**